# Patient Record
Sex: MALE | Race: BLACK OR AFRICAN AMERICAN | NOT HISPANIC OR LATINO | Employment: OTHER | URBAN - METROPOLITAN AREA
[De-identification: names, ages, dates, MRNs, and addresses within clinical notes are randomized per-mention and may not be internally consistent; named-entity substitution may affect disease eponyms.]

---

## 2017-05-15 ENCOUNTER — HOSPITAL ENCOUNTER (OUTPATIENT)
Dept: EMERGENCY MEDICINE | Facility: HOSPITAL | Age: 62
End: 2017-05-15
Attending: INTERNAL MEDICINE | Admitting: INTERNAL MEDICINE

## 2017-05-15 ENCOUNTER — HISTORICAL (OUTPATIENT)
Dept: RADIOLOGY | Facility: HOSPITAL | Age: 62
End: 2017-05-15

## 2017-05-15 LAB
ABS NEUT (OLG): 1.38 X10(3)/MCL (ref 2.1–9.2)
ALBUMIN SERPL-MCNC: 3.8 GM/DL (ref 3.4–5)
ALBUMIN/GLOB SERPL: 1 RATIO (ref 1–2)
ALP SERPL-CCNC: 59 UNIT/L (ref 20–120)
ALT SERPL-CCNC: 21 UNIT/L
AMPHET UR QL SCN: NEGATIVE
APPEARANCE, UA: CLEAR
AST SERPL-CCNC: 23 UNIT/L
BACTERIA #/AREA URNS AUTO: ABNORMAL /[HPF]
BARBITURATE SCN PRESENT UR: NEGATIVE
BASOPHILS # BLD AUTO: 0.04 X10(3)/MCL
BASOPHILS NFR BLD AUTO: 1 % (ref 0–1)
BENZODIAZ UR QL SCN: NEGATIVE
BILIRUB SERPL-MCNC: 0.8 MG/DL
BILIRUB UR QL STRIP: NEGATIVE
BILIRUBIN DIRECT+TOT PNL SERPL-MCNC: <0.1 MG/DL
BILIRUBIN DIRECT+TOT PNL SERPL-MCNC: >0.7 MG/DL
BNP BLD-MCNC: 20.2 PG/ML (ref 0–100)
BUN SERPL-MCNC: 14 MG/DL (ref 7–25)
CALCIUM SERPL-MCNC: 8.8 MG/DL (ref 8.4–10.3)
CANNABINOIDS UR QL SCN: NEGATIVE
CHLORIDE SERPL-SCNC: 103 MMOL/L (ref 96–110)
CHOLEST SERPL-MCNC: 211 MG/DL
CHOLEST/HDLC SERPL: 5.6 {RATIO} (ref 0–5)
CK MB SERPL-MCNC: 4.7 NG/ML (ref 0–7.7)
CK MB SERPL-MCNC: 5.3 NG/ML (ref 0–7.7)
CK SERPL-CCNC: 216 IU/L
CK SERPL-CCNC: 268 IU/L
CO2 SERPL-SCNC: 30 MMOL/L (ref 24–32)
COCAINE UR QL SCN: NEGATIVE
COLOR UR: YELLOW
CREAT SERPL-MCNC: 0.75 MG/DL (ref 0.7–1.4)
EOSINOPHIL # BLD AUTO: 0.6 X10(3)/MCL
EOSINOPHIL NFR BLD AUTO: 13 % (ref 0–5)
ERYTHROCYTE [DISTWIDTH] IN BLOOD BY AUTOMATED COUNT: 11.8 % (ref 11.5–14.5)
EST. AVERAGE GLUCOSE BLD GHB EST-MCNC: 103 MG/DL
GLOBULIN SER-MCNC: 3.2 GM/ML (ref 2.3–3.5)
GLUCOSE (UA): NORMAL
GLUCOSE SERPL-MCNC: 147 MG/DL (ref 65–99)
HBA1C MFR BLD: 5.2 % (ref 4.7–5.6)
HCT VFR BLD AUTO: 43 % (ref 40–51)
HDLC SERPL-MCNC: 38 MG/DL
HGB BLD-MCNC: 14.1 GM/DL (ref 13.5–17.5)
HGB UR QL STRIP: NEGATIVE
HYALINE CASTS #/AREA URNS LPF: ABNORMAL /[LPF]
IMM GRANULOCYTES # BLD AUTO: 0.02 10*3/UL
IMM GRANULOCYTES NFR BLD AUTO: 0 %
INR PPP: 1.05 (ref 0.9–1.2)
KETONES UR QL STRIP: NEGATIVE
LDLC SERPL CALC-MCNC: 140 MG/DL (ref 0–130)
LEUKOCYTE ESTERASE UR QL STRIP: NEGATIVE
LYMPHOCYTES # BLD AUTO: 2.18 X10(3)/MCL
LYMPHOCYTES NFR BLD AUTO: 49 % (ref 15–40)
MAGNESIUM SERPL-MCNC: 2 MG/DL (ref 1.5–2.6)
MCH RBC QN AUTO: 29.2 PG (ref 26–34)
MCHC RBC AUTO-ENTMCNC: 32.8 GM/DL (ref 31–37)
MCV RBC AUTO: 89 FL (ref 80–100)
MONOCYTES # BLD AUTO: 0.25 X10(3)/MCL
MONOCYTES NFR BLD AUTO: 6 % (ref 4–12)
NEUTROPHILS # BLD AUTO: 1.38 X10(3)/MCL
NEUTROPHILS NFR BLD AUTO: 31 X10(3)/MCL
NITRITE UR QL STRIP: NEGATIVE
OPIATES UR QL SCN: NEGATIVE
PCP UR QL: NEGATIVE
PH UR STRIP: 6 [PH] (ref 4.5–8)
PLATELET # BLD AUTO: 220 X10(3)/MCL (ref 130–400)
PMV BLD AUTO: 11 FL (ref 7.4–10.4)
POC TROPONIN: 0.01 NG/ML (ref 0–0.08)
POC TROPONIN: 0.01 NG/ML (ref 0–0.08)
POTASSIUM SERPL-SCNC: 3.4 MMOL/L (ref 3.6–5.2)
PROT SERPL-MCNC: 7 GM/DL (ref 6–8)
PROT UR QL STRIP: 10 MG/DL
PROTHROMBIN TIME: 13.5 SECOND(S) (ref 11.9–14.4)
RBC # BLD AUTO: 4.83 X10(6)/MCL (ref 4.5–5.9)
RBC #/AREA URNS AUTO: ABNORMAL /[HPF]
SODIUM SERPL-SCNC: 141 MMOL/L (ref 135–146)
SP GR UR STRIP: 1.02 (ref 1–1.03)
SQUAMOUS #/AREA URNS LPF: ABNORMAL /[LPF]
T4 FREE SERPL-MCNC: 1.22 NG/DL (ref 0.6–1.15)
TRIGL SERPL-MCNC: 163 MG/DL
TSH SERPL-ACNC: 0.97 MIU/L (ref 0.5–5)
UROBILINOGEN UR STRIP-ACNC: NORMAL
VLDLC SERPL CALC-MCNC: 33 MG/DL
WBC # SPEC AUTO: 4.5 X10(3)/MCL (ref 4.5–11)
WBC #/AREA URNS AUTO: ABNORMAL /HPF

## 2017-05-31 ENCOUNTER — HISTORICAL (OUTPATIENT)
Dept: CARDIOLOGY | Facility: HOSPITAL | Age: 62
End: 2017-05-31

## 2017-05-31 LAB
APTT PPP: 31.2 SECOND(S) (ref 23.3–37)
INR PPP: 0.99 (ref 0.9–1.2)
PROTHROMBIN TIME: 12.9 SECOND(S) (ref 11.9–14.4)

## 2017-09-14 ENCOUNTER — HISTORICAL (OUTPATIENT)
Dept: CARDIOLOGY | Facility: CLINIC | Age: 62
End: 2017-09-14

## 2017-09-14 LAB
ALBUMIN SERPL-MCNC: 3.4 GM/DL (ref 3.4–5)
ALBUMIN/GLOB SERPL: 1 RATIO (ref 1–2)
ALP SERPL-CCNC: 76 UNIT/L (ref 45–117)
ALT SERPL-CCNC: 33 UNIT/L (ref 12–78)
AST SERPL-CCNC: 21 UNIT/L (ref 15–37)
BILIRUB SERPL-MCNC: 0.7 MG/DL (ref 0.2–1)
BILIRUBIN DIRECT+TOT PNL SERPL-MCNC: 0.2 MG/DL
BILIRUBIN DIRECT+TOT PNL SERPL-MCNC: 0.5 MG/DL
BUN SERPL-MCNC: 13 MG/DL (ref 7–18)
CALCIUM SERPL-MCNC: 8.1 MG/DL (ref 8.5–10.1)
CHLORIDE SERPL-SCNC: 106 MMOL/L (ref 98–107)
CHOLEST SERPL-MCNC: 140 MG/DL
CHOLEST/HDLC SERPL: 3 {RATIO} (ref 0–5)
CO2 SERPL-SCNC: 31 MMOL/L (ref 21–32)
CREAT SERPL-MCNC: 0.8 MG/DL (ref 0.6–1.3)
GLOBULIN SER-MCNC: 3.5 GM/ML (ref 2.3–3.5)
GLUCOSE SERPL-MCNC: 131 MG/DL (ref 74–106)
HDLC SERPL-MCNC: 47 MG/DL
LDLC SERPL CALC-MCNC: 81 MG/DL (ref 0–130)
POTASSIUM SERPL-SCNC: 3.4 MMOL/L (ref 3.5–5.1)
PROT SERPL-MCNC: 6.9 GM/DL (ref 6.4–8.2)
SODIUM SERPL-SCNC: 143 MMOL/L (ref 136–145)
TRIGL SERPL-MCNC: 60 MG/DL
VLDLC SERPL CALC-MCNC: 12 MG/DL

## 2018-12-14 ENCOUNTER — HISTORICAL (OUTPATIENT)
Dept: ADMINISTRATIVE | Facility: HOSPITAL | Age: 63
End: 2018-12-14

## 2018-12-14 LAB
ALBUMIN SERPL-MCNC: 3.8 GM/DL (ref 3.4–5)
ALBUMIN/GLOB SERPL: 1 RATIO (ref 1–2)
ALP SERPL-CCNC: 77 UNIT/L (ref 45–117)
ALT SERPL-CCNC: 39 UNIT/L (ref 12–78)
APTT PPP: 36 SECOND(S) (ref 23.3–37)
AST SERPL-CCNC: 18 UNIT/L (ref 15–37)
BILIRUB SERPL-MCNC: 0.3 MG/DL (ref 0.2–1)
BILIRUBIN DIRECT+TOT PNL SERPL-MCNC: 0.1 MG/DL
BILIRUBIN DIRECT+TOT PNL SERPL-MCNC: 0.2 MG/DL
BUN SERPL-MCNC: 12 MG/DL (ref 7–18)
CALCIUM SERPL-MCNC: 9 MG/DL (ref 8.5–10.1)
CHLORIDE SERPL-SCNC: 102 MMOL/L (ref 98–107)
CO2 SERPL-SCNC: 30 MMOL/L (ref 21–32)
CREAT SERPL-MCNC: 0.9 MG/DL (ref 0.6–1.3)
ERYTHROCYTE [DISTWIDTH] IN BLOOD BY AUTOMATED COUNT: 11.6 % (ref 11.5–14.5)
EST. AVERAGE GLUCOSE BLD GHB EST-MCNC: 148 MG/DL
GLOBULIN SER-MCNC: 3.9 GM/ML (ref 2.3–3.5)
GLUCOSE SERPL-MCNC: 121 MG/DL (ref 74–106)
HBA1C MFR BLD: 6.8 % (ref 4.2–6.3)
HCT VFR BLD AUTO: 40.9 % (ref 40–51)
HGB BLD-MCNC: 13.5 GM/DL (ref 13.5–17.5)
INR PPP: 1.19 (ref 0.9–1.2)
MCH RBC QN AUTO: 29.7 PG (ref 26–34)
MCHC RBC AUTO-ENTMCNC: 33 GM/DL (ref 31–37)
MCV RBC AUTO: 89.9 FL (ref 80–100)
PLATELET # BLD AUTO: 219 X10(3)/MCL (ref 130–400)
PMV BLD AUTO: 11.4 FL (ref 7.4–10.4)
POTASSIUM SERPL-SCNC: 3.2 MMOL/L (ref 3.5–5.1)
PROT SERPL-MCNC: 7.7 GM/DL (ref 6.4–8.2)
PROTHROMBIN TIME: 15 SECOND(S) (ref 11.9–14.4)
RBC # BLD AUTO: 4.55 X10(6)/MCL (ref 4.5–5.9)
SODIUM SERPL-SCNC: 139 MMOL/L (ref 136–145)
WBC # SPEC AUTO: 7.4 X10(3)/MCL (ref 4.5–11)

## 2018-12-21 ENCOUNTER — HISTORICAL (OUTPATIENT)
Dept: SURGERY | Facility: HOSPITAL | Age: 63
End: 2018-12-21

## 2018-12-21 LAB
HCT VFR BLD AUTO: 35.3 % (ref 40–51)
HGB BLD-MCNC: 11.6 GM/DL (ref 13.5–17.5)
POTASSIUM SERPL-SCNC: 4.4 MMOL/L (ref 3.5–5.1)

## 2020-03-12 ENCOUNTER — HISTORICAL (OUTPATIENT)
Dept: RADIOLOGY | Facility: HOSPITAL | Age: 65
End: 2020-03-12

## 2020-08-04 ENCOUNTER — HISTORICAL (OUTPATIENT)
Dept: RADIOLOGY | Facility: HOSPITAL | Age: 65
End: 2020-08-04

## 2020-09-14 ENCOUNTER — HISTORICAL (OUTPATIENT)
Dept: RADIOLOGY | Facility: HOSPITAL | Age: 65
End: 2020-09-14

## 2022-04-10 ENCOUNTER — HISTORICAL (OUTPATIENT)
Dept: ADMINISTRATIVE | Facility: HOSPITAL | Age: 67
End: 2022-04-10

## 2022-04-21 ENCOUNTER — HISTORICAL (OUTPATIENT)
Dept: RADIOLOGY | Facility: HOSPITAL | Age: 67
End: 2022-04-21

## 2022-04-21 ENCOUNTER — HISTORICAL (OUTPATIENT)
Dept: ADMINISTRATIVE | Facility: HOSPITAL | Age: 67
End: 2022-04-21

## 2022-04-25 VITALS
DIASTOLIC BLOOD PRESSURE: 74 MMHG | OXYGEN SATURATION: 94 % | WEIGHT: 242.5 LBS | BODY MASS INDEX: 29.53 KG/M2 | HEIGHT: 76 IN | SYSTOLIC BLOOD PRESSURE: 123 MMHG

## 2022-04-30 NOTE — ED PROVIDER NOTES
Patient:   Mitchell Jauregui             MRN: [id number]            FIN: [id number]               Age:   61 years     Sex:  Male     :  1955   Associated Diagnoses:   Atrial fibrillation, new onset   Author:   Dajuan Puga MD      Basic Information   Time seen: Date & time 5/15/2017 09:39:00.   History source: Patient.   Arrival mode: Private vehicle.   History limitation: None.   Additional information: Chief Complaint from Nursing Triage Note : Chief Complaint   5/15/2017 9:28 CDT       Chief Complaint           Pt was having stress test and EKG tech noted A-Fib. States nothing in chart about pt being in A-fib.  .      History of Present Illness   The patient presents with 61-year-old male brought from cardiology secondary to A. fib while having a stress test done just prior to arrival.  Patient denies previous history.  Denies chest pain shortness of breath.  Currently has no complaints..  The onset was just prior to arrival.  The course/duration of symptoms is constant.  Character of symptoms irregular.  The degree at onset was minimal.  The degree at present is minimal.  The exacerbating factor is none.  The relieving factor is none.  Risk factors consist of hypertension.  Prior episodes: none.  Therapy today: see nurses notes.  Associated symptoms: none.        Review of Systems   Constitutional symptoms:  Negative except as documented in HPI.   Skin symptoms:  Negative except as documented in HPI.   Eye symptoms:  Negative except as documented in HPI.   ENMT symptoms:  Negative except as documented in HPI.   Respiratory symptoms:  Negative except as documented in HPI.   Cardiovascular symptoms:  Negative except as documented in HPI.   Gastrointestinal symptoms:  Negative except as documented in HPI.   Genitourinary symptoms:  Negative except as documented in HPI.   Musculoskeletal symptoms:  Negative except as documented in HPI.   Neurologic symptoms:  Negative except as documented in HPI.    Psychiatric symptoms:  Negative except as documented in HPI.   Endocrine symptoms:  Negative except as documented in HPI.   Hematologic/Lymphatic symptoms:  Negative except as documented in HPI.   Allergy/immunologic symptoms:  Negative except as documented in HPI.      Health Status   Allergies:    Allergic Reactions (Selected)  No Known Medication Allergies,    Allergies (1) Active Reaction  No Known Medication Allergies None Documented  .   Medications:  (Selected)   Prescriptions  Prescribed  ENTsol 3% nasal solution: 1 spray(s), Nasal, q10min, PRN PRN for dry nasal passages, # 100 mL, 0 Refill(s)  Flonase 50 mcg/inh nasal spray: 1 spray(s), Nasal, BID, # 1 EA, 0 Refill(s)  Norvasc 5 mg oral tablet: 5 mg = 1 tab(s), Oral, Daily, # 30 tab(s), 1 Refill(s)  Prilosec 40 mg oral DR capsule: 40 mg = 1 cap(s), Oral, Daily, # 30 cap(s), 0 Refill(s)  albuterol 90 mcg/inh inhalation powder: 2 puff(s), INH, q4hr, # 1 unit/2hr, 0 Refill(s)  nabumetone 750 mg oral tablet: 750 mg = 1 tab(s), Oral, BID, # 28 tab(s), 0 Refill(s)  Documented Medications  Documented  aspirin 81 mg oral tablet: 81 mg = 1 tab(s), Oral, Daily, # 30 tab(s), 0 Refill(s).      Past Medical/ Family/ Social History   Medical history:    No active or resolved past medical history items have been selected or recorded., Reviewed as documented in chart.   Surgical history:    Nasal polyp removed 9/2011.  Cholecystectomy; (24564).  BACK.  Hemorrhoidectomy, internal and external, single column/group; with fissurectomy (53670)., Reviewed as documented in chart.   Family history:    Diabetes mellitus type 2  Father  Primary malignant neoplasm of prostate  Father  , Reviewed as documented in chart.   Social history: Reviewed as documented in chart.   Problem list:    Active Problems (2)  Cholesterol   Hypertension   .      Physical Examination               Vital Signs      Vital Signs (last 24 hrs)_____  Last Charted___________  Temp Oral     36.8 DegC  (MAY  15 09:28)  Heart Rate Peripheral   L 52bpm  (MAY 15 09:28)  Resp Rate         18 br/min  (MAY 15 09:28)  SBP      H 151mmHg  (MAY 15 09:28)  DBP      H 93mmHg  (MAY 15 09:28)  SpO2      99 %  (MAY 15 09:28)  Weight      100 kg  (MAY 15 09:28)  Height      194 cm  (MAY 15 09:28)  BMI      26.57  (MAY 15 09:28)  .   General:  Alert, no acute distress.    Skin:  Warm, dry.    Head:  Normocephalic.   Neck:  Supple.   Eye:  Extraocular movements are intact, normal conjunctiva.    Ears, nose, mouth and throat:  Oral mucosa moist, no pharyngeal erythema or exudate.    Cardiovascular:  No edema, irregularly irregular rhythm.    Respiratory:  Respirations are non-labored.   Chest wall:  No tenderness.   Back:  Nontender.   Musculoskeletal:  Normal ROM.   Gastrointestinal:  Soft, Nontender.    Neurological:  No focal neurological deficit observed.   Lymphatics:  No lymphadenopathy.   Psychiatric:  Cooperative, appropriate mood & affect.       Medical Decision Making   Documents reviewed:  Emergency department nurses' notes, emergency department records, prior records.    Electrocardiogram:  Time 5/15/2017 09:32:00, rate 115, EP Interp, A. fib with RVR..    Results review:  Lab results : Lab View   5/15/2017 10:08 CDT      POC Troponin              0.01 ng/mL    5/15/2017 10:05 CDT      Sodium Lvl                141 mmol/L                             Potassium Lvl             3.4 mmol/L  LOW                             Chloride                  103 mmol/L                             CO2                       30 mmol/L                             Calcium Lvl               8.8 mg/dL                             Glucose Lvl               147 mg/dL  HI                             BUN                       14 mg/dL                             Creatinine                0.75 mg/dL                             eGFR-AA                   >105 mL/min                             eGFR-AC                  >105 mL/min                              Bili Total                0.8 mg/dL                             Bili Direct               <0.1 mg/dL                             Bili Indirect             >0.7 mg/dL                             AST                       23 unit/L                             ALT                       21 unit/L                             Alk Phos                  59 unit/L                             Total Protein             7.0 gm/dL                             Albumin Lvl               3.8 gm/dL                             Globulin                  3.20 gm/mL                             A/G Ratio                 1 ratio                             BNP                       20.2 pg/mL                             Total CK                  268 IU/L  HI                             CK MB                     5.3 ng/mL                             PT                        13.5 second(s)                             INR                       1.05                             WBC                       4.5 x10(3)/mcL                             RBC                       4.83 x10(6)/mcL                             Hgb                       14.1 gm/dL                             Hct                       43.0 %                             Platelet                  220 x10(3)/mcL                             MCV                       89.0 fL                             MCH                       29.2 pg                             MCHC                      32.8 gm/dL                             RDW                       11.8 %                             MPV                       11.0 fL  HI                             Abs Neut                  1.38 x10(3)/mcL  LOW                             Neutro Auto               31 x10(3)/mcL  NA                             Lymph Auto                49 %  HI                             Mono Auto                 6 %                             Eos Auto                  13 %  HI                              Abs Eos                   0.60 x10(3)/mcL  NA                             Basophil Auto             1 %                             Abs Neutro                1.38 x10(3)/mcL  NA                             Abs Lymph                 2.18 x10(3)/mcL  NA                             Abs Mono                  0.25 x10(3)/mcL  NA                             Abs Baso                  0.04 x10(3)/mcL  NA                             IG%                       0 %  NA                             IG#                       0.0200  NA    5/15/2017 10:00 CDT      UA Appear                 Clear                             UA Color                  YELLOW                             UA Spec Grav              1.016                             UA Bili                   Negative                             UA pH                     6.0                             UA Urobilinogen           Normal                             UA Blood                  Negative                             UA Glucose                Normal                             UA Ketones                Negative                             UA Protein                10 mg/dL                             UA Nitrite                Negative                             UA Leuk Est               Negative                             UA WBC Interp             0-2 /HPF                             UA RBC Interp             0-2                             UA Bact Interp            None Seen                             UA Squam Epi Interp       2-20                             UA Hyal Cast Interp       6-10                             U Amph Scr                Negative                             U Marjorie Scr                Negative                             U Benzodia Scr            Negative                             U Cannab Scr              Negative                             U Cocaine Scr             Negative                             U Opiate Scr              Negative                              U Phencyclidine Scr       Negative  ,    No qualifying data available.    Radiology results:  Rad Results (ST)  < 12 hrs   Accession: [id number]  Order: XR Chest 2 Views  Report Dt/Tm: 05/15/2017 11:06  Report:   XR Chest 2 Views     REASON FOR STUDY: Tachycardia     TECHNIQUE: Frontal and lateral radiographs of the chest     COMPARISON: Radiographs from 4/8/2017     FINDINGS: Cardiac silhouette and mediastinal contours are within  normal limits. The lungs are well-inflated. No consolidation or  evidence of pulmonary edema. There is no pleural effusion.         IMPRESSION: No acute pulmonary process appreciated.          .      Reexamination/ Reevaluation   Course: progressing as expected.      Impression and Plan   Diagnosis   Atrial fibrillation, new onset (CID43-GE I48.91)      Calls-Consults   -  IM on call.   Plan   Condition: Stable.    Disposition: Admit time  5/15/2017 12:51:00, Place in Observation Unit, Dispositioned by: Time: 5/15/2017 10:08:00, Dajuan Puga MD.    Counseled: Patient, Regarding diagnosis, Regarding diagnostic results, Regarding treatment plan, Regarding prescription, Patient indicated understanding of instructions.

## 2022-04-30 NOTE — H&P
"   Patient:   Mitchell Jauregui             MRN: [id number]            FIN: [id number]               Age:   61 years     Sex:  Male     :  1955   Associated Diagnoses:   None   Author:   Cj Rodriguez MD      Chief Complaint:  New onset atrial fibrillation    HPI:  61-year-old -American male with a past medical history of hypertension came to [facility] today for scheduled Lexiscan, prior to procedure EKG was ordered and showed patient was in atrial fibrillation with a rate of 90, after patient completed Lexiscan he was sent to ED for workup of new onset atrial fibrillation.  Patient currently denying any symptoms, he reports that he was sent for Lexiscan because he told his primary care doctor that he felt "puffy, swollen" around his heart, however he denies any symptoms of chest pain or feelings of tightness, denies any heart palpitations, syncope, shortness of breath, leg swelling, dry cough, orthopnea, PND, he reports that he can walk several miles without become short of breath.  Patient also denies any symptoms of heat/cold intolerance, denies any weight gain/weight loss, denies hair loss, skin changes, or diarrhea, patient does report constipation.  Patient only takes Norvasc at home for hypertension, does not have a history of diabetes, or vascular disease.  He denies a history of tobacco abuse, drinks beers on the weekends every once in a while, denies any illicit drug use.  Currently does not report any other acute symptoms.    Allergies:  NKDA    Past Medical Hx:  Hypertension    Past Surgical Hx:  Nasal polyp removal:   Cholecystectomy: 2015  Back surgery: Spinal fusion was still rods  Hemorrhoidectomy    Past Family Hx:  Father: Diabetes    Past Social Hx:  Alcohol Use: Beer intermittently on weekends  Tobacco Use: Denies  Illicit Drug Use: Denies   Occupation: Disabled    Review Of Systems:  Constitutional: no fever, no night sweats, chills or fatigue  HEENT: no acute vision changes or " photobia, no hearing loss  CVS: no chest pain, palpitations, or orthopnea  Resp: no SOB, cough, or wheezing  GI: no abd pain, nausea, vomiting, or diarrhea  : no dysuria, urinary incontinence  Musculoskeletal: no joint stiffness, pain, swelling or weakness  Skin: no rashes, lesions  Neuro: no headaches, syncope, seizures, or numbness  Psych: no depression or anxiety    Home meds:         Prescribed             albuterol: 2 puff(s), INH, q4hr, 1 unit/2hr, 0 Refill(s).             amLODIPine: 5 mg, 1 tab(s), Oral, Daily, for 30 day(s), 30 tab(s), 1               Refill(s).             fluticasone nasal: 1 spray(s), Nasal, BID, 1 EA, 0 Refill(s).             sodium chloride nasal: 1 spray(s), Nasal, q10min, PRN: for dry nasal               passages, 100 mL, 0 Refill(s).         Documented             aspirin: 81 mg, 1 tab(s), Oral, Daily, 30 tab(s), 0 Refill(s).    Physical Examination:  Vital Signs (last 24 hrs)_____  Last Charted___________  Temp Oral     36.8 DegC  (MAY 15 09:28)  Heart Rate Peripheral   77 bpm  (MAY 15 11:19)  Resp Rate         H 25br/min  (MAY 15 13:24)  SBP      H 170mmHg  (MAY 15 13:24)  DBP      H 118mmHg  (MAY 15 13:24)  SpO2      96 %  (MAY 15 13:24)  Weight      100 kg  (MAY 15 09:28)  Height      194 cm  (MAY 15 09:28)  BMI      26.57  (MAY 15 09:28)    General: AAOx3, NAD, alert and cooperative  HEENT: PERRLA, EOMI, normal conjunctiva  Neck: no LAD, no JVD, supple, no masses or thyromegaly  CVS: S1/S2 nml, irregularly irregular rhythm, no murmurs, rubs or gallops, no carotid bruits  Resp: CTA B/L, no rhonchi, rales, or wheezing  GI: no TTP, not distended, BS+  : no CVA tenderness  Skin: not jaundiced, warm, no rashes  Musculoskeletal: normal ROM, no joint tenderness, normal muscular development  Extremities: no peripheral edema, peripheral pulses intact  Neuro: CN II-XII grossly intact, strength and sensation symmetric and intact throughout, no focal neurological  deficits    Labs:  CMP: Potassium 3.4, BUN/creatinine14/0.75 otherwise electrolytes, LFTs within normal limits.  BNP: 20.2    Cardiac enzymes: Total CK268, CK-MB5.3, POC troponin0.01    PT/INR: 13.5/1.05    CBC: Within normal limits    Urinalysis: Within normal limits    UDS: Negative    EKG:  A. fib, heart rate 1:15, normal AK/QRS QT intervals, no ST segment changes, no T-wave inversions    Radiology:  Accession: [id number]  Order: XR Chest 2 Views  Report Dt/Tm: 05/15/2017 11:06  Report:   XR Chest 2 Views     REASON FOR STUDY: Tachycardia     TECHNIQUE: Frontal and lateral radiographs of the chest     COMPARISON: Radiographs from 4/8/2017     FINDINGS: Cardiac silhouette and mediastinal contours are within  normal limits. The lungs are well-inflated. No consolidation or  evidence of pulmonary edema. There is no pleural effusion.         IMPRESSION: No acute pulmonary process appreciated.        Assessment and Plan:   New onset atrial fibrillation  -Patient presented to [facility] for Lexiscan, found to be in atrial fibrillation, currently asymptomatic, rate of 90  -We'll admit patient to telemetry with monitor to determine etiology of atrial fibrillation, ordered TSH/free T4, echocardiogram, trend cardiac enzymes  -BNP/electrolytes/CBC/chest x-ray within normal limits  -CHADSVASc: 1, ordered lipid panel, hemoglobin A1c  -Patient currently rate controlled, order metoprolol 10 mg IV for heart rate greater than 110    Hypertension   Patient's blood pressure 158/93, reports he did not take his Norvasc 5 mg this morning, will restart patient on Norvasc, labetalol 20 mg IV for blood pressure greater than 160/110    Disposition: Admit patient to telemetry with monitor, follow-up Lexiscan, echocardiogram, TSH, hemoglobin A1c, if results returned within normal limits and patient remains rate controlled, will discharge tomorrow with cardiology clinic follow-up.

## 2023-02-07 ENCOUNTER — HOSPITAL ENCOUNTER (OUTPATIENT)
Dept: RADIOLOGY | Facility: HOSPITAL | Age: 68
Discharge: HOME | End: 2023-02-07
Payer: MEDICARE

## 2023-02-07 DIAGNOSIS — M25.561 PAIN IN RIGHT KNEE: ICD-10-CM

## 2023-02-07 PROCEDURE — 73562 X-RAY EXAM OF KNEE 3: CPT | Mod: TC,RT

## 2023-10-31 ENCOUNTER — HOSPITAL ENCOUNTER (OUTPATIENT)
Dept: RADIOLOGY | Facility: HOSPITAL | Age: 68
Discharge: HOME | End: 2023-10-31
Attending: NURSE PRACTITIONER
Payer: MEDICARE

## 2023-10-31 DIAGNOSIS — M25.511 RIGHT SHOULDER PAIN: ICD-10-CM

## 2023-10-31 PROCEDURE — 73030 X-RAY EXAM OF SHOULDER: CPT | Mod: TC,RT

## 2023-11-16 DIAGNOSIS — M25.511 CHRONIC RIGHT SHOULDER PAIN: Primary | ICD-10-CM

## 2023-11-16 DIAGNOSIS — G89.29 CHRONIC RIGHT SHOULDER PAIN: Primary | ICD-10-CM

## 2023-12-05 ENCOUNTER — OFFICE VISIT (OUTPATIENT)
Dept: ORTHOPEDICS | Facility: CLINIC | Age: 68
End: 2023-12-05
Payer: MEDICARE

## 2023-12-05 ENCOUNTER — HOSPITAL ENCOUNTER (OUTPATIENT)
Dept: RADIOLOGY | Facility: HOSPITAL | Age: 68
Discharge: HOME | End: 2023-12-05
Attending: STUDENT IN AN ORGANIZED HEALTH CARE EDUCATION/TRAINING PROGRAM
Payer: MEDICARE

## 2023-12-05 VITALS
BODY MASS INDEX: 26.96 KG/M2 | WEIGHT: 221.38 LBS | DIASTOLIC BLOOD PRESSURE: 78 MMHG | HEART RATE: 80 BPM | TEMPERATURE: 98 F | SYSTOLIC BLOOD PRESSURE: 136 MMHG | HEIGHT: 76 IN

## 2023-12-05 DIAGNOSIS — S46.811A STRAIN OF RIGHT INFRASPINATUS TENDON, INITIAL ENCOUNTER: ICD-10-CM

## 2023-12-05 DIAGNOSIS — S46.811A TRAUMATIC RUPTURE OF SUPRASPINATUS TENDON OF RIGHT SHOULDER, INITIAL ENCOUNTER: Primary | ICD-10-CM

## 2023-12-05 DIAGNOSIS — M25.511 CHRONIC RIGHT SHOULDER PAIN: ICD-10-CM

## 2023-12-05 DIAGNOSIS — G89.29 CHRONIC RIGHT SHOULDER PAIN: ICD-10-CM

## 2023-12-05 PROCEDURE — 99213 OFFICE O/P EST LOW 20 MIN: CPT | Mod: PBBFAC

## 2023-12-05 PROCEDURE — 73030 X-RAY EXAM OF SHOULDER: CPT | Mod: TC,RT

## 2023-12-05 RX ORDER — PLANT STANOL ESTER 450 MG
8000 TABLET ORAL
COMMUNITY

## 2023-12-05 RX ORDER — ALBUTEROL SULFATE 90 UG/1
2 AEROSOL, METERED RESPIRATORY (INHALATION) EVERY 4 HOURS PRN
COMMUNITY
Start: 2023-02-23

## 2023-12-05 RX ORDER — RIVAROXABAN 20 MG/1
20 TABLET, FILM COATED ORAL
COMMUNITY
Start: 2023-11-28

## 2023-12-05 RX ORDER — LORAZEPAM 2 MG/1
2 TABLET ORAL 2 TIMES DAILY PRN
COMMUNITY

## 2023-12-05 RX ORDER — DICLOFENAC SODIUM 75 MG/1
75 TABLET, DELAYED RELEASE ORAL 2 TIMES DAILY PRN
Qty: 15 TABLET | Refills: 0 | Status: SHIPPED | OUTPATIENT
Start: 2023-12-05 | End: 2024-01-04

## 2023-12-05 RX ORDER — ALBUTEROL SULFATE 0.83 MG/ML
SOLUTION RESPIRATORY (INHALATION)
COMMUNITY

## 2023-12-05 RX ORDER — FERROUS SULFATE 325(65) MG
65 TABLET ORAL
COMMUNITY

## 2023-12-05 RX ORDER — METOPROLOL SUCCINATE 25 MG/1
25 TABLET, EXTENDED RELEASE ORAL
COMMUNITY

## 2023-12-05 RX ORDER — RIVAROXABAN 10 MG/1
TABLET, FILM COATED ORAL
COMMUNITY
Start: 2023-03-28

## 2023-12-05 RX ORDER — AMLODIPINE BESYLATE 10 MG/1
10 TABLET ORAL
COMMUNITY

## 2023-12-05 RX ORDER — GLIPIZIDE 5 MG/1
TABLET ORAL
COMMUNITY
Start: 2023-04-25

## 2023-12-05 RX ORDER — DEXBROMPHENIRAMINE MALEATE 2 MG/1
TABLET ORAL
COMMUNITY

## 2023-12-05 RX ORDER — LANOLIN ALCOHOL/MO/W.PET/CERES
500 CREAM (GRAM) TOPICAL
COMMUNITY

## 2023-12-05 NOTE — PROGRESS NOTES
"Subjective:    Patient ID: Mitchell Jauregui is a right handed 68 y.o. male  who presented to [facility] for new visit..      Chief Complaint: Pain of the Right Shoulder (Patient is here today for  Right Shoulder pain.  It has been going on for  2 months. States pain started when he was lifting on heavy objects, trying to put them into the back of his truck. States had difficulty with shoulder/arm after. Describes his pain as intermittent aching. Is currently not taking any medication for pain at this time.   )      History of Present Illness:    Mitchell Jauregui who has a history of Afib on xarelto and ASA, HTN, HLD, hyperglycemia, OA, RYAN, T2DM and left knee OA presented today for right supraspinatous tear involving the right for the past 2 months. Pain is located at AC joint, lateral acromion, deltoid muscle. Quality of pain is described as throbbing.  Non radiating. Inciting event: injured while picking up an aluminum car wheel .  Pain is aggravated by reaching, lifting. He has difficulty drinking out of glasses with his right arm. Night pain yes. Patient has had no prior shoulder problems. Evaluation to date: plain films, MRI, and PCP evaluation. Treatment to date: topical analgesics and ice/heat. 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     Shoulder Review of Systems:  Swelling?  no  Instability?  yes  Clicking?  no  Limited ROM? yes  Fever/Chills? no  Subluxation? no  Dislocation? no    Current Choice of Exercise:  none       Objective:      Physical Exam:    /78   Pulse 80   Temp 97.6 °F (36.4 °C)   Ht 6' 4" (1.93 m)   Wt 100.4 kg (221 lb 6.4 oz)   BMI 26.95 kg/m²     Appearance:  Soft tissue swelling: Left: no Right: no  Effusion: Left:  Negative Right: Negative  Erythema: Left no Right: no  Ecchymosis: Left: no Right: no  Atrophy: Left: no Right: yes  Scapular winging: Left: no Right: " no    Palpation:  Shoulder Tenderness: Left: none  Right: AC joint, biceps tendon, lateral acromion, deltoid muscle, supraspinatus    Range of motion:  Flexion (0-90): Left:  90 Right: 90  Abduction (0-180): Left:  180 Right: 160  External rotation (0-55): Left: 55 Right: 55  Internal rotation (0-45): Left: 45 Right: 45    Strength:  Abduction: Left: 5/5 Pain: no Right: 4/5 Pain: yes  External rotation: Left: 5/5 Pain: no Right: 4/5 Pain: yes  Internal rotation: Left: 5/5 Pain: no Right: 5/5 Pain: no  Elbow flexion: Left: 5/5 Pain: no Right: 5/5 Pain: no  Elbow extension: Left: 5/5 Pain: no Right: 5/5 Pain: no    Special Tests:  Subacromial Impingement  Neer: Left: Negative Right: Positive  Correa: Left: Negative Right: Positive    AC Joint Arthritis:  Cross-body abduction: Left: Negative Right: Positive    Rotator Cuff Tear   Drop arm test: Left: Negative Right: Negative  Hornblower: Left: Left: Not performed Right: Positive 4/5 w/ pain  Belly press test: Left: Negative Right: Negative  Felipe test (Empty can): Left: Negative Right: Positive 4/5 w/ pain    Biceps tendon/Labral tear  Speed's: Left: Negative Right: Negative  Yergason's: Left: Negative Right: Negative  O'Rivas's: Left: Negative Right: Negative  Cranck test: Left: Negative Right: Negative     Cervical   Spurling: Left: Negative Right: Negative    AIN/PIN/Ulnar nerve: Intact and symmetric    General appearance: NAD  Peripheral pulses: normal bilaterally   Reflexes: Left: Not performed Right: Not performed   Sensation: normal    Labs:  Last A1c: The patient doesn't have any registry metric data available     Imaging:   Previous images reviewed.  X-rays ordered and performed today: yes  # of views: 4 Laterality: right  My Interpretation:  Decreased acromial humeral interval, osteophytes to the inferior and lateral acromion.  Osteophyte versus bony Bankart of the inferior glenoid.     11/08/2023 MRI right shoulder without contrast:  High-grade  partial-thickness supraspinatus tear.  Severe tendinosis of the supraspinatus, infraspinatus and subscapularis tendon with moderate supraspinatus, infraspinatus and mild subscapularis muscle atrophy.  Glenohumeral and acromioclavicular osteoarthritis with labral tear.  (report only)  Assessment:        Encounter Diagnoses   Code Name Primary?    S46.811A Traumatic rupture of supraspinatus tendon of right shoulder, initial encounter Yes    S46.811A Strain of right infraspinatus tendon, initial encounter         Plan:           Orders Placed This Encounter   Procedures    X-ray Shoulder 2 or More Views Right     Standing Status:   Future     Number of Occurrences:   1     Standing Expiration Date:   12/5/2024     Order Specific Question:   May the Radiologist modify the order per protocol to meet the clinical needs of the patient?     Answer:   Yes     Order Specific Question:   Release to patient     Answer:   Immediate     Medications Ordered This Encounter   Medications    diclofenac (VOLTAREN) 75 MG EC tablet     Sig: Take 1 tablet (75 mg total) by mouth 2 (two) times daily as needed. Please request refill of this medication from your PCP.     Dispense:  15 tablet     Refill:  0     Please request refill of this medication from your PCP.       MDM: Prior external referring provider notes reviewed. Prior external referring provider studies reviewed.   Dx:  Traumatic High-grade partial-thickness supraspinatus tear right shoulder.  Tendinosis of the infraspinatus and subscapularis of the right shoulder.  New problems in moderate exacerbation.  Treatment Plan: Discussed with patient diagnosis and treatment recommendations.   -patient is having weakness of the supraspinatus infraspinatus and subscapularis.  MRI report shows high-grade partial-thickness tear of the supraspinatus with atrophy and tendinosis of the infraspinatus subscapularis.  -patient is active in would benefit from a return to function.  -operative  versus nonoperative management discussed with the patient patient like to consider operative management at this time.  -recommend patient bring his MRI disc with him and we will follow up in 1 month to discuss further management.  Imaging: radiological studies ordered and independently reviewed; discussed with patient; pending radiologist interpretation.   Procedure:  Not appropriate at this time as patient is considering surgical management.  We will discuss at follow-up.  Therapy: No formal therapy  Medication: START diclofenac 75mg twice a day. Please see your primary care physician for further refills.  RTC:  1 month with MRI disc.         Bobby Cavazos MD.  Note dictated using MModal.

## 2023-12-15 NOTE — PROGRESS NOTES
Faculty Attestation: Mitchell Jauregui  was seen in Sports Medicine Clinic. Discussed with Dr. Cavazos at the time of the visit. History of Present Illness, Physical Exam, and Assessment and Plan reviewed. Treatment plan is reasonable and appropriate. Compliance with treatment recommendations is important.  Radiology images independently reviewed and agree with fellow interpretation.  No procedure was performed.     Cirilo Rivas MD  Sports Medicine

## 2024-12-27 ENCOUNTER — HOSPITAL ENCOUNTER (EMERGENCY)
Facility: HOSPITAL | Age: 69
Discharge: HOME | End: 2024-12-27
Attending: EMERGENCY MEDICINE
Payer: MEDICARE

## 2024-12-27 VITALS
WEIGHT: 220 LBS | HEART RATE: 75 BPM | BODY MASS INDEX: 26.79 KG/M2 | RESPIRATION RATE: 19 BRPM | TEMPERATURE: 99 F | SYSTOLIC BLOOD PRESSURE: 148 MMHG | OXYGEN SATURATION: 98 % | DIASTOLIC BLOOD PRESSURE: 88 MMHG | HEIGHT: 76 IN

## 2024-12-27 DIAGNOSIS — J20.9 ACUTE BRONCHITIS, UNSPECIFIED ORGANISM: Primary | ICD-10-CM

## 2024-12-27 LAB
FLUAV AG UPPER RESP QL IA.RAPID: NOT DETECTED
FLUBV AG UPPER RESP QL IA.RAPID: NOT DETECTED
SARS-COV-2 RNA RESP QL NAA+PROBE: NOT DETECTED

## 2024-12-27 PROCEDURE — 99284 EMERGENCY DEPT VISIT MOD MDM: CPT | Mod: 25

## 2024-12-27 PROCEDURE — 0240U COVID/FLU A&B PCR: CPT | Performed by: EMERGENCY MEDICINE

## 2024-12-27 RX ORDER — AZITHROMYCIN 250 MG/1
TABLET, FILM COATED ORAL
Qty: 6 TABLET | Refills: 0 | Status: SHIPPED | OUTPATIENT
Start: 2024-12-27 | End: 2025-01-01

## 2024-12-27 RX ORDER — ALBUTEROL SULFATE 0.83 MG/ML
2.5 SOLUTION RESPIRATORY (INHALATION) EVERY 4 HOURS PRN
Qty: 30 EACH | Refills: 0 | Status: SHIPPED | OUTPATIENT
Start: 2024-12-27

## 2024-12-27 NOTE — ED PROVIDER NOTES
Encounter Date: 12/27/2024       History     Chief Complaint   Patient presents with    Cough     Cough and congestion for 1 week. Denies fever     HPI  69-year-old male history of hypertension complaining of one-week history of productive cough of greenish sputum with no associated shortness of breath, substernal chest pain, nausea vomiting fever chills diarrhea.  Patient does take an albuterol nebulizer at home and is requesting a refill for that medication.  No ill contacts.  Patient is not vaccinated against COVID-19 or influenza.  Review of patient's allergies indicates:  No Known Allergies  Past Medical History:   Diagnosis Date    Hypertension      Past Surgical History:   Procedure Laterality Date    BACK SURGERY      CHOLECYSTECTOMY      hemorrhoid ectomy       Family History   Family history unknown: Yes     Social History     Tobacco Use    Smoking status: Never    Smokeless tobacco: Never   Substance Use Topics    Alcohol use: Not Currently    Drug use: Never     Review of Systems   All other systems reviewed and are negative.      Physical Exam     Initial Vitals [12/27/24 1036]   BP Pulse Resp Temp SpO2   (!) 156/83 73 20 98.6 °F (37 °C) 97 %      MAP       --         Physical Exam    Nursing note and vitals reviewed.  Constitutional: He appears well-developed and well-nourished. He is cooperative.   HENT:   Head: Normocephalic and atraumatic.   Eyes: Conjunctivae, EOM and lids are normal. Pupils are equal, round, and reactive to light.   Neck: Trachea normal. Neck supple.   Normal range of motion.  Cardiovascular:  Normal rate, regular rhythm, normal heart sounds and intact distal pulses.           Pulmonary/Chest: Breath sounds normal. No respiratory distress. He has no wheezes. He has no rhonchi. He has no rales.   Abdominal: Abdomen is soft. Bowel sounds are normal. He exhibits no distension and no mass. There is no abdominal tenderness. There is no rebound and no guarding.   Musculoskeletal:       Cervical back: Normal range of motion and neck supple.     Neurological: He is alert and oriented to person, place, and time. He has normal strength.   Skin: Skin is warm and dry. No rash noted.   Psychiatric: He has a normal mood and affect. His speech is normal and behavior is normal.         ED Course   Procedures  Labs Reviewed   COVID/FLU A&B PCR - Normal       Result Value    Influenza A PCR Not Detected      Influenza B PCR Not Detected      SARS-CoV-2 PCR Not Detected      Narrative:     The Xpert Xpress SARS-CoV-2/FLU/RSV plus is a rapid, multiplexed real-time PCR test intended for the simultaneous qualitative detection and differentiation of SARS-CoV-2, Influenza A, Influenza B, and respiratory syncytial virus (RSV) viral RNA in either nasopharyngeal swab or nasal swab specimens.                Imaging Results              X-Ray Chest PA And Lateral (Final result)  Result time 12/27/24 11:53:51      Final result by Sean Escalante MD (12/27/24 11:53:51)                   Impression:      1. Pulmonary nodule again noted to the lateral right upper lobe with questionable slight interim increase in size.  2. Thoracic spondylosis      Electronically signed by: Sean Escalante  Date:    12/27/2024  Time:    11:53               Narrative:    EXAMINATION:  XR CHEST PA AND LATERAL    CLINICAL HISTORY:  , Cough, unspecified.    COMPARISON:  04/21/2022    FINDINGS:  PA/AP and lateral views reveal to be normal in size.  The trachea is midline.  No definite infiltrate or effusion is seen.  Degenerative changes are noted to the thoracic spine.  A ill-defined nodule again evident at the lateral right upper lobe with questionable slight interim increase in size no infiltrate or effusion is seen.  Degenerative changes are noted to the thoracic spine.                                       Medications - No data to display  Medical Decision Making  Amount and/or Complexity of Data Reviewed  Radiology:  ordered.    Risk  Prescription drug management.    61-year-old male complaining of one-week history of productive cough of greenish sputum.  O2 sat 97% on room air.  Afebrile.  Lungs are clear to auscultation.  COVID, influenza negative.  Chest x-ray with no acute focal infiltrates or effusions.  We will discharge home with Zithromax Rx, Tessalon Perles, refill albuterol nebulizer, close follow up with PCP in 1-2 days for recheck, return for worsening symptoms or any other concerns.                                  Clinical Impression:  Final diagnoses:  [J20.9] Acute bronchitis, unspecified organism (Primary)          ED Disposition Condition    Discharge Stable          ED Prescriptions       Medication Sig Dispense Start Date End Date Auth. Provider    albuterol (PROVENTIL) 2.5 mg /3 mL (0.083 %) nebulizer solution Take 3 mLs (2.5 mg total) by nebulization every 4 (four) hours as needed for Wheezing. Rescue 30 each 12/27/2024 -- Juventino Centeno MD    azithromycin (Z-NOHEMY) 250 MG tablet Take 2 tablets by mouth on day 1; Take 1 tablet by mouth on days 2-5 6 tablet 12/27/2024 1/1/2025 Juventino Centeno MD          Follow-up Information       Follow up With Specialties Details Why Contact Info    Theresa Mcclain NP Family Medicine Schedule an appointment as soon as possible for a visit in 2 days     [place] [address]  [phone]               Juventino Centeno MD  12/28/24 0621

## 2025-02-17 ENCOUNTER — HOSPITAL ENCOUNTER (EMERGENCY)
Facility: HOSPITAL | Age: 70
Discharge: HOME | End: 2025-02-17
Attending: INTERNAL MEDICINE
Payer: MEDICARE

## 2025-02-17 VITALS
RESPIRATION RATE: 20 BRPM | TEMPERATURE: 98 F | HEART RATE: 77 BPM | SYSTOLIC BLOOD PRESSURE: 139 MMHG | BODY MASS INDEX: 26.79 KG/M2 | HEIGHT: 76 IN | OXYGEN SATURATION: 98 % | DIASTOLIC BLOOD PRESSURE: 71 MMHG | WEIGHT: 220 LBS

## 2025-02-17 DIAGNOSIS — J18.9 PNEUMONIA OF LEFT LOWER LOBE DUE TO INFECTIOUS ORGANISM: Primary | ICD-10-CM

## 2025-02-17 LAB
FLUAV AG UPPER RESP QL IA.RAPID: NOT DETECTED
FLUBV AG UPPER RESP QL IA.RAPID: NOT DETECTED
RSV A 5' UTR RNA NPH QL NAA+PROBE: NOT DETECTED
SARS-COV-2 RNA RESP QL NAA+PROBE: NOT DETECTED

## 2025-02-17 PROCEDURE — 25000242 PHARM REV CODE 250 ALT 637 W/ HCPCS

## 2025-02-17 PROCEDURE — 99284 EMERGENCY DEPT VISIT MOD MDM: CPT | Mod: 25

## 2025-02-17 PROCEDURE — 0241U COVID/RSV/FLU A&B PCR: CPT

## 2025-02-17 PROCEDURE — 94761 N-INVAS EAR/PLS OXIMETRY MLT: CPT

## 2025-02-17 PROCEDURE — 94640 AIRWAY INHALATION TREATMENT: CPT

## 2025-02-17 RX ORDER — AMOXICILLIN AND CLAVULANATE POTASSIUM 875; 125 MG/1; MG/1
1 TABLET, FILM COATED ORAL 2 TIMES DAILY
Qty: 10 TABLET | Refills: 0 | Status: SHIPPED | OUTPATIENT
Start: 2025-02-17 | End: 2025-02-22

## 2025-02-17 RX ORDER — DOXYCYCLINE 100 MG/1
100 CAPSULE ORAL 2 TIMES DAILY
Qty: 10 CAPSULE | Refills: 0 | Status: SHIPPED | OUTPATIENT
Start: 2025-02-17 | End: 2025-02-22

## 2025-02-17 RX ORDER — ALBUTEROL SULFATE 0.83 MG/ML
2.5 SOLUTION RESPIRATORY (INHALATION) EVERY 6 HOURS PRN
Qty: 30 EACH | Refills: 3 | Status: SHIPPED | OUTPATIENT
Start: 2025-02-17 | End: 2025-03-19

## 2025-02-17 RX ORDER — ALBUTEROL SULFATE 0.83 MG/ML
2.5 SOLUTION RESPIRATORY (INHALATION)
Status: COMPLETED | OUTPATIENT
Start: 2025-02-17 | End: 2025-02-17

## 2025-02-17 RX ORDER — BENZONATATE 100 MG/1
100 CAPSULE ORAL 3 TIMES DAILY PRN
Qty: 21 CAPSULE | Refills: 0 | Status: SHIPPED | OUTPATIENT
Start: 2025-02-17 | End: 2025-02-24

## 2025-02-17 RX ADMIN — ALBUTEROL SULFATE 2.5 MG: 2.5 SOLUTION RESPIRATORY (INHALATION) at 01:02

## 2025-02-17 NOTE — ED PROVIDER NOTES
"Encounter Date: 2/17/2025       History     Chief Complaint   Patient presents with    Nasal Congestion    Wheezing     C/o sinus congestion and wheezing since Thursday     See MDM    The history is provided by the patient. No  was used.     Review of patient's allergies indicates:  No Known Allergies  Past Medical History:   Diagnosis Date    Hypertension      Past Surgical History:   Procedure Laterality Date    BACK SURGERY      CHOLECYSTECTOMY      hemorrhoid ectomy       Family History   Family history unknown: Yes     Social History[1]  Review of Systems   Constitutional:         Cough, congestion,"whistling noise when he breathes", rhinorrhea   All other systems reviewed and are negative.      Physical Exam     Initial Vitals [02/17/25 1258]   BP Pulse Resp Temp SpO2   (!) 176/71 95 18 97.3 °F (36.3 °C) (!) 94 %      MAP       --         Physical Exam    Nursing note and vitals reviewed.  Constitutional: He appears well-developed and well-nourished.   HENT:   Head: Normocephalic and atraumatic.   Right Ear: External ear normal.   Left Ear: External ear normal.   Nose: Mucosal edema and rhinorrhea present. Mouth/Throat: Uvula is midline. Posterior oropharyngeal erythema present.   Bilateral cerumen impaction, unable to visualize TM   Eyes: EOM are normal.   Neck:   Normal range of motion.  Cardiovascular:  Normal rate, regular rhythm and intact distal pulses.           Pulmonary/Chest: Effort normal. No accessory muscle usage. No tachypnea and no bradypnea. No respiratory distress. He has rhonchi in the right upper field, the right middle field, the right lower field, the left upper field, the left middle field and the left lower field. He has rales in the right upper field and the left upper field.   Abdominal: Abdomen is soft. Bowel sounds are normal.   Musculoskeletal:         General: Normal range of motion.      Cervical back: Normal range of motion.     Neurological: He is alert and " oriented to person, place, and time.   Skin: Skin is warm and dry.   Psychiatric: He has a normal mood and affect.         ED Course   Procedures  Labs Reviewed   COVID/RSV/FLU A&B PCR - Normal       Result Value    Influenza A PCR Not Detected      Influenza B PCR Not Detected      Respiratory Syncytial Virus PCR Not Detected      SARS-CoV-2 PCR Not Detected      Narrative:     The Xpert Xpress SARS-CoV-2/FLU/RSV plus is a rapid, multiplexed real-time PCR test intended for the simultaneous qualitative detection and differentiation of SARS-CoV-2, Influenza A, Influenza B, and respiratory syncytial virus (RSV) viral RNA in either nasopharyngeal swab or nasal swab specimens.                Imaging Results              X-Ray Chest PA And Lateral (Final result)  Result time 02/17/25 14:45:39      Final result by Sean Escalante MD (02/17/25 14:45:39)                   Impression:      1. Subtle nodularity again evident at the lateral right upper lobe overlying the anterior right 2nd rib  2. Developing infiltrate and or atelectasis left lung base  3. Thoracic spondylosis      Electronically signed by: Sean Escalante  Date:    02/17/2025  Time:    14:45               Narrative:    EXAMINATION:  XR CHEST PA AND LATERAL    CLINICAL HISTORY:  Adventitious breath sounds;, .    COMPARISON:  12/27/2024    FINDINGS:  PA/AP and lateral views reveal the heart to be normal in size.  The trachea is just right of midline.  Atherosclerosis is seen within the aorta.  There is subtle nodularity again evident at the lateral right upper lobe.  There is interim increasing patchy density at the left lower lung.  No consolidative infiltrate or effusion is seen.  Degenerative changes are noted to the thoracic spine.                                       Medications   albuterol nebulizer solution 2.5 mg (2.5 mg Nebulization Given 2/17/25 1354)     Medical Decision Making  The patient is a 69 y.o. male with a pertinent PMHX of AFib on Xarelto,  "HTN, dm who presents to the Emergency Department with a chief complaint of cough, congestion. Symptoms began last Thursday and have been constant since onset. Associated symptoms include "whistling sound when he breathes", runny nose.  Symptoms are aggravated with nothing and alleviated with nothing. The patient denies chest pain, sore throat, ear pain, shortness of breath, nausea, vomiting, fever, abdominal pain, dysuria, COVID, RSV, pneumonia or flu vaccination, history of asthma, history of COPD, smoking. They report taking nothing prior to arrival with no relief of symptoms. No other reported symptoms at this time.    Pertinent physical exam findings include diffuse rhonchi, bilateral upper lobe rales, posterior oropharyngeal erythema, rhinorrhea with some turbinate edema.  Vital signs notable for HTN, otherwise stable.  Patient states that he took his blood pressure medication today.  Patient given nebulizer treatment while in the ER.    COVID, flu, RSV negative.  CXR Impression:  1. Subtle nodularity again evident at the lateral right upper lobe overlying the anterior right 2nd rib  2. Developing infiltrate and or atelectasis left lung base  3. Thoracic spondylosis    Laboratory and imaging findings discussed with patient.  Patient endorsing some improvement in symptoms after nebulizer treatment.  Antibiotics, Tessalon and nebulizers sent to pharmacy.  We will treat as pneumonia based off of patient's signs, imaging and physical exam findings.  Advised close PCP follow up.  Discharge instructions and strict return precautions provided. Patient verbalized understanding and agreement of plan. All questions answered.    Portions of this note have been created with voice recognition software. Occasional "wrong-words" or "sound alike" substitutions may have occurred due to inherent limitations of voice software. Please read the note carefully and recognize, using context, word substitutions may have occurred. "       Amount and/or Complexity of Data Reviewed  Labs: ordered. Decision-making details documented in ED Course.  Radiology: ordered. Decision-making details documented in ED Course.    Risk  Prescription drug management.      Additional MDM:   Differential Diagnosis:   Judging by the patient's chief complaint and pertinent history, the patient has the following possible differential diagnoses, including but not limited to the following:  COVID, flu, RSV, pneumonia, bronchitis  Some of these are deemed to be lower likelihood and some more likely based on my physical exam and history combined with possible lab work and/or imaging studies. Please see the pertinent studies, and refer to the HPI. Some of these diagnoses will take further evaluation to fully rule out, perhaps as an outpatient and the patient was encouraged to follow up when discharged for more comprehensive evaluation.                                       Clinical Impression:  Final diagnoses:  [J18.9] Pneumonia of left lower lobe due to infectious organism (Primary)          ED Disposition Condition    Discharge Stable          ED Prescriptions       Medication Sig Dispense Start Date End Date Auth. Provider    amoxicillin-clavulanate 875-125mg (AUGMENTIN) 875-125 mg per tablet Take 1 tablet by mouth 2 (two) times daily. for 5 days 10 tablet 2/17/2025 2/22/2025 Camelia Rojas PA-C    doxycycline (VIBRAMYCIN) 100 MG Cap Take 1 capsule (100 mg total) by mouth 2 (two) times a day. for 5 days 10 capsule 2/17/2025 2/22/2025 Camelia Rojas PA-C    albuterol (PROVENTIL) 2.5 mg /3 mL (0.083 %) nebulizer solution Take 3 mLs (2.5 mg total) by nebulization every 6 (six) hours as needed for Wheezing. Rescue 30 each 2/17/2025 3/19/2025 Camelia Rojas PA-C    benzonatate (TESSALON) 100 MG capsule Take 1 capsule (100 mg total) by mouth 3 (three) times daily as needed for Cough. 21 capsule 2/17/2025 2/24/2025 Camelia Rojas PA-C          Follow-up Information       Follow  up With Specialties Details Why Contact Info    Theresa Mcclain NP Family Medicine Call in 1 week As needed PO   [place] [address]  [phone]                   [1]   Social History  Tobacco Use    Smoking status: Never    Smokeless tobacco: Never   Substance Use Topics    Alcohol use: Not Currently    Drug use: Never        Camelia Rojas PA-C  02/17/25 1546

## 2025-02-17 NOTE — DISCHARGE INSTRUCTIONS
Take antibiotics as directed.  Use nebulizer as needed for wheezing.  Take Tessalon as needed for cough.  Stay well hydrated.  Follow up with your primary care doctor.  If you experience any new or worsening symptoms return to the emergency department.    Recommend getting updated on your influenza, COVID, pneumonia, RSV vaccines.  You may talk to your primary care doctor about these.    Follow up with your primary care doctor regarding the nodularity at your lateral right upper lobe of your lung overlying the anterior right 2nd rib.

## 2025-03-25 ENCOUNTER — ANESTHESIA (OUTPATIENT)
Dept: ENDOSCOPY | Facility: HOSPITAL | Age: 70
End: 2025-03-25
Payer: MEDICARE

## 2025-03-25 ENCOUNTER — HOSPITAL ENCOUNTER (OUTPATIENT)
Facility: HOSPITAL | Age: 70
Discharge: HOME | End: 2025-03-25
Attending: INTERNAL MEDICINE | Admitting: INTERNAL MEDICINE
Payer: MEDICARE

## 2025-03-25 ENCOUNTER — ANESTHESIA EVENT (OUTPATIENT)
Dept: ENDOSCOPY | Facility: HOSPITAL | Age: 70
End: 2025-03-25
Payer: MEDICARE

## 2025-03-25 VITALS
TEMPERATURE: 97 F | RESPIRATION RATE: 20 BRPM | BODY MASS INDEX: 26.79 KG/M2 | SYSTOLIC BLOOD PRESSURE: 144 MMHG | WEIGHT: 220 LBS | HEART RATE: 68 BPM | OXYGEN SATURATION: 100 % | DIASTOLIC BLOOD PRESSURE: 92 MMHG | HEIGHT: 76 IN

## 2025-03-25 DIAGNOSIS — Z80.0 FAMILY HISTORY OF COLON CANCER: Primary | ICD-10-CM

## 2025-03-25 DIAGNOSIS — M25.511 RIGHT SHOULDER PAIN: Primary | ICD-10-CM

## 2025-03-25 DIAGNOSIS — R19.5 ABNORMAL FECES: ICD-10-CM

## 2025-03-25 LAB — POCT GLUCOSE: 163 MG/DL (ref 70–110)

## 2025-03-25 PROCEDURE — 25000003 PHARM REV CODE 250

## 2025-03-25 PROCEDURE — 37000009 HC ANESTHESIA EA ADD 15 MINS: Performed by: INTERNAL MEDICINE

## 2025-03-25 PROCEDURE — D9220A PRA ANESTHESIA: Mod: PT,CRNA,,

## 2025-03-25 PROCEDURE — D9220A PRA ANESTHESIA: Mod: PT,ANES,, | Performed by: ANESTHESIOLOGY

## 2025-03-25 PROCEDURE — 63600175 PHARM REV CODE 636 W HCPCS

## 2025-03-25 PROCEDURE — 37000008 HC ANESTHESIA 1ST 15 MINUTES: Performed by: INTERNAL MEDICINE

## 2025-03-25 PROCEDURE — 27201423 OPTIME MED/SURG SUP & DEVICES STERILE SUPPLY: Performed by: INTERNAL MEDICINE

## 2025-03-25 PROCEDURE — 45380 COLONOSCOPY AND BIOPSY: CPT | Mod: 59 | Performed by: INTERNAL MEDICINE

## 2025-03-25 PROCEDURE — 88305 TISSUE EXAM BY PATHOLOGIST: CPT | Performed by: INTERNAL MEDICINE

## 2025-03-25 PROCEDURE — 45385 COLONOSCOPY W/LESION REMOVAL: CPT | Performed by: INTERNAL MEDICINE

## 2025-03-25 RX ORDER — GLUCAGON 1 MG
1 KIT INJECTION
Status: DISCONTINUED | OUTPATIENT
Start: 2025-03-25 | End: 2025-03-25 | Stop reason: HOSPADM

## 2025-03-25 RX ORDER — ONDANSETRON HYDROCHLORIDE 2 MG/ML
4 INJECTION, SOLUTION INTRAVENOUS ONCE AS NEEDED
Status: DISCONTINUED | OUTPATIENT
Start: 2025-03-25 | End: 2025-03-25 | Stop reason: HOSPADM

## 2025-03-25 RX ORDER — LIDOCAINE HYDROCHLORIDE 10 MG/ML
1 INJECTION, SOLUTION EPIDURAL; INFILTRATION; INTRACAUDAL; PERINEURAL ONCE
Status: DISCONTINUED | OUTPATIENT
Start: 2025-03-25 | End: 2025-03-25 | Stop reason: HOSPADM

## 2025-03-25 RX ORDER — LIDOCAINE HYDROCHLORIDE 20 MG/ML
INJECTION INTRAVENOUS
Status: DISCONTINUED | OUTPATIENT
Start: 2025-03-25 | End: 2025-03-25

## 2025-03-25 RX ORDER — PROPOFOL 10 MG/ML
VIAL (ML) INTRAVENOUS
Status: DISCONTINUED | OUTPATIENT
Start: 2025-03-25 | End: 2025-03-25

## 2025-03-25 RX ORDER — SODIUM CHLORIDE, SODIUM GLUCONATE, SODIUM ACETATE, POTASSIUM CHLORIDE AND MAGNESIUM CHLORIDE 30; 37; 368; 526; 502 MG/100ML; MG/100ML; MG/100ML; MG/100ML; MG/100ML
INJECTION, SOLUTION INTRAVENOUS CONTINUOUS
Status: DISCONTINUED | OUTPATIENT
Start: 2025-03-25 | End: 2025-03-25 | Stop reason: HOSPADM

## 2025-03-25 RX ADMIN — SODIUM CHLORIDE, SODIUM GLUCONATE, SODIUM ACETATE, POTASSIUM CHLORIDE AND MAGNESIUM CHLORIDE: 526; 502; 368; 37; 30 INJECTION, SOLUTION INTRAVENOUS at 01:03

## 2025-03-25 RX ADMIN — LIDOCAINE HYDROCHLORIDE 20 MG: 20 INJECTION INTRAVENOUS at 01:03

## 2025-03-25 RX ADMIN — PROPOFOL 40 MG: 10 INJECTION, EMULSION INTRAVENOUS at 01:03

## 2025-03-25 RX ADMIN — PROPOFOL 120 MCG/KG/MIN: 10 INJECTION, EMULSION INTRAVENOUS at 01:03

## 2025-03-25 NOTE — ANESTHESIA PREPROCEDURE EVALUATION
03/25/2025  Mitchell Jauregui is a 69 y.o., male, who presents for the following:    Procedure: COLON (Abdomen)   Anesthesia type: General/MAC   Diagnosis:      Abnormal feces [R19.5]      Family history of colon cancer [Z80.0]   Location: [facility] ENDO 01 / [facility] ENDOSCOPY   Surgeons: Tommy Markham MD     Past Medical History:   Diagnosis Date    Diabetes mellitus     Hypertension     Mixed hyperlipidemia        Xarelto:  Last dose 3/22    TTE (2017) :     - Normal LV cavity, EF 45%   - mild MR,  trace TR   - rhythm AFib    Pre-op Assessment    I have reviewed the Patient Summary Reports.     I have reviewed the Nursing Notes. I have reviewed the NPO Status.   I have reviewed the Medications.     Review of Systems  Anesthesia Hx:  No problems with previous Anesthesia             Denies Family Hx of Anesthesia complications.    Denies Personal Hx of Anesthesia complications.                    Social:  Non-Smoker       Cardiovascular:     Hypertension           hyperlipidemia    A FIB Hx                           Pulmonary:  Pulmonary Normal                       Endocrine:  Diabetes               Physical Exam  General: Alert and Oriented    Airway:  Mallampati: II   Mouth Opening: Normal  TM Distance: Normal  Tongue: Normal  Neck ROM: Normal ROM    Dental:  Intact  Missing most his upper teeth / lower teeth intact with normal age-related wear  Chest/Lungs:  Normal Respiratory Rate    Heart:  Rate: Normal  Rhythm: Regular Rhythm        Anesthesia Plan  Type of Anesthesia, risks & benefits discussed:    Anesthesia Type: Gen Natural Airway  Intra-op Monitoring Plan: Standard ASA Monitors  Post Op Pain Control Plan: IV/PO Opioids PRN  Induction:  IV  Airway Plan: Direct  Informed Consent: Informed consent signed with the Patient and all parties understand the risks and agree with anesthesia plan.  All  questions answered. Patient consented to blood products? No  ASA Score: 3  Day of Surgery Review of History & Physical: H&P Update referred to the surgeon/provider.  Anesthesia Plan Notes: Nasal cannula vs facemask supplemental oxygenation         Ready For Surgery From Anesthesia Perspective.     .

## 2025-03-25 NOTE — PROCEDURES
Mitchell Jauregui   MRN: [id number]   ADMISSION DATE: 3/25/2025  : 1955  AGE: 69 y.o.    PROCEDURE:  Colonoscopy with snare polypectomy    DATE OF PROCEDURE:  2025     SURGEON: LEATHA MARLEY    PREOPERATIVE DIAGNOSIS:  Family history of colon cancer    POSTOPERATIVE DIAGNOSIS:  1. Pancolonic diverticula.    2. Prominent fold versus polypoid lesion, proximal descending colon.  Multiple biopsies obtained.  Endoclips x3 were placed secondary to mucosal bleeding.  3. Sigmoid colon polyp, 30 cm, approximately 10-12 mm.  Hot snare polypectomy/Endoclip x1 was done.    4. Internal hemorrhoids, grade 1-2.  Otherwise normal exam      HISTORY AND PHYSICAL:  As per preoperative note.      DESCRIPTION OF PROCEDURE:  Informed consent was obtained.  Patient was placed in left lateral position.  Sedation per anesthesia service.  Rectal exam was unremarkable.  Olympus video colonoscope was introduced into the rectum and was carefully advanced to cecum.  Quality of the preparation was satisfactory after irrigation.  Patient tolerated the procedure well without any complications.    FINDINGS:  There were multiple diverticula noted scattered throughout the colon.  Photodocumentation was obtained.  There was a small polyp, 3-4 mm noted around splenic flexure.  Cold snare polypectomy was done.  There was a prominent fold versus polypoid lesion noted around proximal descending colon.  It was approximately 6-7 mm.  Multiple biopsies were obtained.  There was a mucosal oozing noted from the polypectomy site.  Endoclip x3 were placed with good hemostasis.  There was a 10-12 mm multilobulated polypoid lesion noted at approximately 30 cm from the anal verge in the sigmoid colon.  Hot snare polypectomy was done.  Endoclip x1 (prophylactic) was placed at the base as patient needs to go back on Xarelto in next couple of days.  There were grade 1-2 internal hemorrhoids noted on withdrawal of the scope.  Estimated withdrawal time from  cecum to rectum was more than 15 minutes.    ESTIMATED BLOOD LOSS:  7-8 cc    COMPLICATIONS:  None    RECOMMENDATIONS:  1. Follow up biopsy results.  Hold Xarelto for 48-72 hours.  Can resume after if no evidence of GI bleed.    3. Patient to follow up with me as scheduled.  4. Avoid NSAIDs for 2 weeks  5. Liquid diet today.  Advance tomorrow as tolerated.

## 2025-03-25 NOTE — DISCHARGE SUMMARY
[facility]  Discharge Note  Short Stay    Procedure(s) (LRB):  COLON (N/A)  COLONOSCOPY, WITH POLYPECTOMY USING SNARE  COLONOSCOPY, WITH 1 OR MORE BIOPSIES  COLONOSCOPY, WITH POLYPECTOMY USING HOT BIOPSY FORCEPS      OUTCOME: Patient tolerated treatment/procedure well without complication and is now ready for discharge.    DISPOSITION: Home or Self Care    FINAL DIAGNOSIS:  Family history of colon cancer    FOLLOWUP: In clinic    DISCHARGE INSTRUCTIONS:    Discharge Procedure Orders   Diet general      Hold Xarelto for next 2-3 days.  Can resume Xarelto on 03/28/2025 (Friday) if no evidence of GI bleed.      TIME SPENT ON DISCHARGE: 10 minutes

## 2025-03-25 NOTE — TRANSFER OF CARE
"Anesthesia Transfer of Care Note    Patient: Mitchell Jauregui    Procedure(s) Performed: Procedure(s) (LRB):  COLON (N/A)  COLONOSCOPY, WITH POLYPECTOMY USING SNARE  COLONOSCOPY, WITH 1 OR MORE BIOPSIES  COLONOSCOPY, WITH POLYPECTOMY USING HOT BIOPSY FORCEPS    Patient location: GI    Anesthesia Type: MAC    Transport from OR: Transported from OR on room air with adequate spontaneous ventilation    Post pain: adequate analgesia    Post assessment: no apparent anesthetic complications    Post vital signs: stable    Level of consciousness: awake    Nausea/Vomiting: no nausea/vomiting    Complications: none    Transfer of care protocol was followed      Last vitals: Visit Vitals  /72 (BP Location: Left arm, Patient Position: Lying)   Pulse (!) 57   Temp 36.5 °C (97.7 °F) (Temporal)   Resp 17   Ht 6' 4" (1.93 m)   Wt 99.8 kg (220 lb)   SpO2 97%   BMI 26.78 kg/m²     "

## 2025-03-25 NOTE — ANESTHESIA POSTPROCEDURE EVALUATION
Anesthesia Post Evaluation    Patient: Mitchell Jauregui    Procedure(s) Performed: Procedure(s) (LRB):  COLON (N/A)  COLONOSCOPY, WITH POLYPECTOMY USING SNARE  COLONOSCOPY, WITH 1 OR MORE BIOPSIES  COLONOSCOPY, WITH POLYPECTOMY USING HOT BIOPSY FORCEPS    Final Anesthesia Type: general      Patient location during evaluation: GI PACU  Patient participation: Yes- Able to Participate  Level of consciousness: oriented and awake  Post-procedure vital signs: reviewed and stable  Pain management: adequate  Airway patency: patent    PONV status at discharge: No PONV  Anesthetic complications: no      Cardiovascular status: stable and hemodynamically stable  Respiratory status: spontaneous ventilation and unassisted  Hydration status: euvolemic  Follow-up not needed.  Comments: [facility]              Vitals Value Taken Time   /92 03/25/25 14:44   Temp 36.3 °C (97.3 °F) 03/25/25 14:25   Pulse 68 03/25/25 14:44   Resp 20 03/25/25 14:44   SpO2 100 % 03/25/25 14:44         No case tracking events are documented in the log.      Pain/Aurelia Score: Aurelia Score: 10 (3/25/2025  2:44 PM)

## 2025-03-26 ENCOUNTER — HOSPITAL ENCOUNTER (EMERGENCY)
Facility: HOSPITAL | Age: 70
Discharge: HOME | End: 2025-03-26
Attending: EMERGENCY MEDICINE
Payer: MEDICARE

## 2025-03-26 VITALS
DIASTOLIC BLOOD PRESSURE: 85 MMHG | WEIGHT: 220 LBS | SYSTOLIC BLOOD PRESSURE: 135 MMHG | TEMPERATURE: 98 F | BODY MASS INDEX: 26.79 KG/M2 | RESPIRATION RATE: 18 BRPM | HEART RATE: 85 BPM | OXYGEN SATURATION: 100 % | HEIGHT: 76 IN

## 2025-03-26 DIAGNOSIS — S29.012A STRAIN OF THORACIC BACK REGION: Primary | ICD-10-CM

## 2025-03-26 LAB — PSYCHE PATHOLOGY RESULT: NORMAL

## 2025-03-26 PROCEDURE — 99283 EMERGENCY DEPT VISIT LOW MDM: CPT | Mod: 25

## 2025-03-26 PROCEDURE — 25000003 PHARM REV CODE 250: Performed by: EMERGENCY MEDICINE

## 2025-03-26 RX ORDER — ACETAMINOPHEN 500 MG
1000 TABLET ORAL
Status: COMPLETED | OUTPATIENT
Start: 2025-03-26 | End: 2025-03-26

## 2025-03-26 RX ADMIN — ACETAMINOPHEN 1000 MG: 500 TABLET ORAL at 01:03

## 2025-03-26 NOTE — ED PROVIDER NOTES
Encounter Date: 3/26/2025       History     Chief Complaint   Patient presents with    Back Pain     C/o mid/ upper back pain x 3 weeks. Denies injury.      HPI  A 69-year-old male history of diabetes, hypertension, HLD complaining of midthoracic back pain x3 weeks with no obvious traumatic injury.  Patient denies associated motor or sensory loss, incontinence or saddle anesthesia.  Patient also denies headache, neck pain, substernal chest pain, shortness of breath, abdominal pain, dysuria or hematuria.  Patient has not taken any medication for the pain.  He states the pain is worse with movement of his upper torso and relieved by rest.  Review of patient's allergies indicates:  No Known Allergies  Past Medical History:   Diagnosis Date    Diabetes mellitus     Hypertension     Mixed hyperlipidemia      Past Surgical History:   Procedure Laterality Date    BACK SURGERY      CHOLECYSTECTOMY      COLONOSCOPY N/A 3/25/2025    Procedure: COLON;  Surgeon: Tommy Markham MD;  Location: [facility] ENDOSCOPY;  Service: Gastroenterology;  Laterality: N/A;    COLONOSCOPY, WITH 1 OR MORE BIOPSIES  3/25/2025    Procedure: COLONOSCOPY, WITH 1 OR MORE BIOPSIES;  Surgeon: Tommy Markham MD;  Location: [facility] ENDOSCOPY;  Service: Gastroenterology;;    COLONOSCOPY, WITH POLYPECTOMY USING HOT BIOPSY FORCEPS  3/25/2025    Procedure: COLONOSCOPY, WITH POLYPECTOMY USING HOT BIOPSY FORCEPS;  Surgeon: Tommy Markham MD;  Location: [facility] ENDOSCOPY;  Service: Gastroenterology;;    COLONOSCOPY, WITH POLYPECTOMY USING SNARE  3/25/2025    Procedure: COLONOSCOPY, WITH POLYPECTOMY USING SNARE;  Surgeon: Tommy Markham MD;  Location: [facility] ENDOSCOPY;  Service: Gastroenterology;;    hemorrhoid ectomy       Family History   Family history unknown: Yes     Social History[1]  Review of Systems   All other systems reviewed and are negative.      Physical Exam     Initial Vitals [03/26/25 1315]   BP Pulse Resp Temp SpO2   138/77 75 20 98 °F (36.7  °C) 96 %      MAP       --         Physical Exam    Nursing note and vitals reviewed.  Constitutional: He appears well-developed. He is cooperative.   HENT:   Head: Normocephalic and atraumatic.   Nose: Nose normal. Mouth/Throat: Oropharynx is clear and moist.   Eyes: Conjunctivae and lids are normal.   Neck: Trachea normal. Neck supple.   Normal range of motion.  Cardiovascular:  Normal rate, regular rhythm, normal heart sounds and intact distal pulses.           Pulmonary/Chest: Breath sounds normal.   Abdominal: Abdomen is soft. Bowel sounds are normal. There is no abdominal tenderness.   Musculoskeletal:         General: Tenderness present.      Cervical back: Normal range of motion and neck supple.      Comments: Tender to palpation over midthoracic spine paraspinal greater than right T2-T4 with no vertebral step-offs      Neurological: He is alert and oriented to person, place, and time. He has normal strength. GCS score is 15. GCS eye subscore is 4. GCS verbal subscore is 5. GCS motor subscore is 6.   Skin: Skin is warm and dry. No rash noted.   Psychiatric: He has a normal mood and affect. His speech is normal and behavior is normal. Judgment and thought content normal.         ED Course   Procedures  Labs Reviewed - No data to display       Imaging Results              X-Ray Thoracic Spine AP Lateral (Final result)  Result time 03/26/25 14:09:04      Final result by Sean Escalante MD (03/26/25 14:09:04)                   Impression:      1. No acute osseous defect identified  2. Thoracic spondylosis and slight scoliosis  3. Mild osteopenia      Electronically signed by: Sean Escalante  Date:    03/26/2025  Time:    14:09               Narrative:    EXAMINATION:  THORACIC SPINE:    CLINICAL HISTORY:  , back pain;    COMPARISON:  None available    FINDINGS:  AP and lateral views reveal visualized vertebral body height and alignment to be intact.  The uppermost thoracic spine is suboptimally visualized on  the lateral view.  Disc space narrowing and osteophyte formation is seen.  Twelve rib-bearing thoracic vertebral bodies are identified.  There is slight curvature of the thoracic spine with the convexity to right.  Bony structures are osteopenic.                        Wet Read by Juventino Cetneno MD (03/26/25 13:49:23, [facility] - Emergency Dept, Emergency Medicine)    No obvious fracture or dislocation, DJD                                     Medications   acetaminophen tablet 1,000 mg (1,000 mg Oral Given 3/26/25 1342)     Medical Decision Making  Amount and/or Complexity of Data Reviewed  Radiology: ordered and independent interpretation performed.    Risk  OTC drugs.    61-year-old male complaining of midthoracic back pain x3 weeks with no history of injury.  Patient is tender to palpation over his thoracic spine paraspinal with no vertebral step-offs or midline pain.  Patient with nonfocal neuro exam.  Patient with no complaints of focal motor or sensory changes.  Thoracic spine x-rays with no obvious fractures or other abnormalities.  Patient received Tylenol 1 g p.o. here and is feeling much better and will be discharged home with Tylenol for pain, warm compresses to affected area, Salonpas to affected area, close follow up with PCP in 2 days for recheck, return for worsening symptoms or any other concerns.                                  Clinical Impression:  Final diagnoses:  [S29.012A] Strain of thoracic back region (Primary)          ED Disposition Condition    Discharge           ED Prescriptions    None       Follow-up Information       Follow up With Specialties Details Why Contact Info    Theresa Mcclain NP Family Medicine Schedule an appointment as soon as possible for a visit in 1 day  PO   [facility] [address]  [phone]                   [1]   Social History  Tobacco Use    Smoking status: Never    Smokeless tobacco: Never   Substance Use Topics    Alcohol use: Not  Currently    Drug use: Never        Juventino Centeno MD  03/27/25 0653

## 2025-03-26 NOTE — DISCHARGE INSTRUCTIONS
Use warm compresses to affected area 2 times a day, use Lidoderm patches to affected area as needed, Tylenol for pain, follow up with your primary care physician in 1-2 days for recheck, return for worsening symptoms or any other concerns

## 2025-04-01 ENCOUNTER — HOSPITAL ENCOUNTER (OUTPATIENT)
Dept: RADIOLOGY | Facility: CLINIC | Age: 70
Discharge: HOME | End: 2025-04-01
Attending: ORTHOPAEDIC SURGERY
Payer: MEDICARE

## 2025-04-01 ENCOUNTER — OFFICE VISIT (OUTPATIENT)
Dept: ORTHOPEDICS | Facility: CLINIC | Age: 70
End: 2025-04-01
Payer: MEDICARE

## 2025-04-01 VITALS
BODY MASS INDEX: 27.03 KG/M2 | DIASTOLIC BLOOD PRESSURE: 80 MMHG | SYSTOLIC BLOOD PRESSURE: 121 MMHG | HEIGHT: 76 IN | HEART RATE: 64 BPM | WEIGHT: 222 LBS

## 2025-04-01 DIAGNOSIS — M25.511 RIGHT SHOULDER PAIN: ICD-10-CM

## 2025-04-01 DIAGNOSIS — M75.121 NONTRAUMATIC COMPLETE TEAR OF RIGHT ROTATOR CUFF: Primary | ICD-10-CM

## 2025-04-01 PROCEDURE — 73030 X-RAY EXAM OF SHOULDER: CPT | Mod: RT,,, | Performed by: ORTHOPAEDIC SURGERY

## 2025-04-01 RX ORDER — ROSUVASTATIN CALCIUM 40 MG/1
40 TABLET, COATED ORAL
COMMUNITY
Start: 2025-02-21

## 2025-04-01 NOTE — PROGRESS NOTES
Subjective:      Patient ID: Mitchell Jauregui is a 69 y.o. male.    Chief Complaint: Pain of the Right Shoulder ((RHD) Right Shoulder RTC patient states his RTC is gone. MRI in chart 3/31/25. No injury just happened over time from work. He did PT once and they showed him how to lift things properly to relief pain. He has to use other arm to help brace.  )    HPI:     History of Present Illness    CHIEF COMPLAINT:  - Right shoulder pain with known rotator cuff tear.    HPI:  Mitchell presents for evaluation of right shoulder pain. He reports having an MRI of the shoulder approximately one year ago at a regional facility. His right shoulder pain is severe, as indicated by this provider at [facility] who stated that he needs a rotator cuff repair and possibly a shoulder replacement. He is concerned about his right-handedness and the importance of this arm for his daily activities. He mentions some numbness in his thumb. He is retired.    He denies any recent trauma or injury to the shoulder area.    IMAGING:  - MRI Right Shoulder: 1 year ago, revealed a complete rotator cuff tear  - XR Right Shoulder: showed possible rotator cuff deficiency    WORK STATUS:  - Retired  - Previously worked in [employer] for 41 years    SOCIAL HISTORY:  - Living in [employer] for 41 years  - Originally from [place]      ROS:  Musculoskeletal: +joint pain, +pain with movement          Past Medical History:   Diagnosis Date    Diabetes mellitus     Hypertension     Mixed hyperlipidemia      Past Surgical History:   Procedure Laterality Date    BACK SURGERY      CHOLECYSTECTOMY      COLONOSCOPY N/A 3/25/2025    Procedure: COLON;  Surgeon: Tommy Markham MD;  Location: [facility] ENDOSCOPY;  Service: Gastroenterology;  Laterality: N/A;    COLONOSCOPY, WITH 1 OR MORE BIOPSIES  3/25/2025    Procedure: COLONOSCOPY, WITH 1 OR MORE BIOPSIES;  Surgeon: Tommy Markham MD;  Location: [facility] ENDOSCOPY;  Service: Gastroenterology;;    COLONOSCOPY, WITH POLYPECTOMY USING HOT  "BIOPSY FORCEPS  3/25/2025    Procedure: COLONOSCOPY, WITH POLYPECTOMY USING HOT BIOPSY FORCEPS;  Surgeon: Tommy Markham MD;  Location: [facility] ENDOSCOPY;  Service: Gastroenterology;;    COLONOSCOPY, WITH POLYPECTOMY USING SNARE  3/25/2025    Procedure: COLONOSCOPY, WITH POLYPECTOMY USING SNARE;  Surgeon: Tommy Markham MD;  Location: [facility] ENDOSCOPY;  Service: Gastroenterology;;    hemorrhoid ectomy       Social History[1]    Current Medications[2]  Review of patient's allergies indicates:  No Known Allergies    /80 (BP Location: Left arm, Patient Position: Sitting)   Pulse 64   Ht 6' 4" (1.93 m)   Wt 100.7 kg (222 lb)   BMI 27.02 kg/m²     Comprehensive review of systems completed and negative except as per HPI.        Objective:   General: Well-developed, well-nourished.  Neuro: Alert and oriented x 3.  Psych: Normal mood and affect.  Card: Regular rate and rhythm  Resp: Respirations regular and unlabored    Shoulder Exam:  No obvious deformity. No medial or lateral scapula winging. Forward flexion to 140 degrees and abduction to 140 degrees. Positive empty can,  positive Whipple, Positive drop arm test, Correa, impingement, Positive AC joint tenderness. Negative biceps groove tenderness, Positive Flanagan´s, Yergason´s, Speed test. Negative Apprehension and Relocation test. 4/5 strength, normal skin appearance and palpable pulses distally. Sensibility normal.        Assessment:         1. Nontraumatic complete tear of right rotator cuff          Plan:       Orders Placed This Encounter    X-ray Shoulder 2 or More Views Right        Imaging and exam findings discussed.     Assessment & Plan    PROCEDURES:  - # Procedures  - Discussed obtaining an updated MRI of the right shoulder to assess the current condition of the rotator cuff.  - Potential treatment options based on MRI results: rotator cuff tendon repair if repairable, or shoulder replacement if not repairable.  - Risks and benefits of both " procedures, including hospital stay duration and post-op limitations.    IMAGING:  - Ordered MRI Right Shoulder.    FOLLOW UP:  - Follow up after MRI.    PATIENT INSTRUCTIONS:  - Avoid lifting more than 10 lbs with the affected upper extremity after potential shoulder replacement.               All questions were answered. Patient happy and in agreement with the plan.     This note was generated with the assistance of ambient listening technology. Verbal consent was obtained by the patient and accompanying visitor(s) for the recording of patient appointment to facilitate this note. I attest to having reviewed and edited the generated note for accuracy, though some syntax or spelling errors may persist. Please contact the author of this note for any clarification.           [1]   Social History  Socioeconomic History    Marital status: Single   Tobacco Use    Smoking status: Never    Smokeless tobacco: Never   Substance and Sexual Activity    Alcohol use: Not Currently    Drug use: Never   [2]   Current Outpatient Medications:     amLODIPine (NORVASC) 10 MG tablet, Take 10 mg by mouth., Disp: , Rfl:     ascorbic Acid (VITAMIN C) 500 mg CpSR, Take 500 mg by mouth., Disp: , Rfl:     calcium carbonate (TUMS) 300 mg (750 mg) Chew, Take 300 mg by mouth., Disp: , Rfl:     ferrous sulfate (FEOSOL) 325 mg (65 mg iron) Tab tablet, Take 65 mg by mouth., Disp: , Rfl:     glipiZIDE (GLUCOTROL) 5 MG tablet, , Disp: , Rfl:     LORazepam (ATIVAN) 2 MG Tab, Take 2 mg by mouth 2 (two) times daily as needed., Disp: , Rfl:     metoprolol succinate (TOPROL-XL) 25 MG 24 hr tablet, Take 25 mg by mouth., Disp: , Rfl:     rosuvastatin (CRESTOR) 40 MG Tab, Take 40 mg by mouth., Disp: , Rfl:     vitamin A 8000 UNIT capsule, Take 8,000 Units by mouth., Disp: , Rfl:     XARELTO 20 mg Tab, Take 20 mg by mouth., Disp: , Rfl:     albuterol (PROVENTIL) 2.5 mg /3 mL (0.083 %) nebulizer solution, Take 3 mLs (2.5 mg total) by nebulization every 6  (six) hours as needed for Wheezing. Rescue, Disp: 30 each, Rfl: 3    dexbrompheniramine maleate (ALA-HIST IR) 2 mg Tab, Ala-Hist IR 2 mg tablet  Take 1 tablet 3 times a day by oral route as needed. (Patient not taking: Reported on 4/1/2025), Disp: , Rfl:     XARELTO 10 mg Tab, , Disp: , Rfl:

## 2025-04-06 ENCOUNTER — HOSPITAL ENCOUNTER (EMERGENCY)
Facility: HOSPITAL | Age: 70
Discharge: HOME | End: 2025-04-06
Attending: EMERGENCY MEDICINE
Payer: MEDICARE

## 2025-04-06 VITALS
SYSTOLIC BLOOD PRESSURE: 152 MMHG | WEIGHT: 225.5 LBS | RESPIRATION RATE: 18 BRPM | OXYGEN SATURATION: 99 % | HEART RATE: 62 BPM | HEIGHT: 76 IN | DIASTOLIC BLOOD PRESSURE: 86 MMHG | BODY MASS INDEX: 27.46 KG/M2 | TEMPERATURE: 98 F

## 2025-04-06 DIAGNOSIS — G47.00 INSOMNIA, UNSPECIFIED TYPE: Primary | ICD-10-CM

## 2025-04-06 PROCEDURE — 99283 EMERGENCY DEPT VISIT LOW MDM: CPT

## 2025-04-06 PROCEDURE — 25000003 PHARM REV CODE 250: Performed by: EMERGENCY MEDICINE

## 2025-04-06 RX ORDER — HYDROXYZINE PAMOATE 25 MG/1
25 CAPSULE ORAL NIGHTLY PRN
Qty: 10 CAPSULE | Refills: 0 | Status: SHIPPED | OUTPATIENT
Start: 2025-04-06

## 2025-04-06 RX ORDER — HYDROXYZINE PAMOATE 25 MG/1
25 CAPSULE ORAL
Status: COMPLETED | OUTPATIENT
Start: 2025-04-06 | End: 2025-04-06

## 2025-04-06 RX ADMIN — HYDROXYZINE PAMOATE 25 MG: 25 CAPSULE ORAL at 10:04

## 2025-04-07 NOTE — DISCHARGE INSTRUCTIONS
It is your responsibility to obtain follow up care as instructed. It is your responsibility to call the clinic, specialist, or primary care physician you are referred to for follow-up care and make an appointment. When you call to make an appointment, advise the  that you were referred by the emergency department.    If you become concerned about your condition and are unable to contact your physician, or if your condition is a medical emergency, return to the emergency department.

## 2025-04-07 NOTE — ED PROVIDER NOTES
Encounter Date: 4/6/2025       History     Chief Complaint   Patient presents with    Insomnia     C/O inability to sleep last pm. States taken Ativan as prescribed at bedtime w/o relief. Denies know reason for restlessness. Skin w/d. Resp e/u. NADN     The patient presents with insomnia.  Last night he took his usual 4 mg of Ativan at bedtime but was unable to sleep.  He was not hurting.  He did not have racing thoughts.  He has been on 4 mg of Ativan nightly for quite some time through his PCP.  He takes no stimulants.        Review of patient's allergies indicates:  No Known Allergies  Past Medical History:   Diagnosis Date    Diabetes mellitus     Hypertension     Mixed hyperlipidemia      Past Surgical History:   Procedure Laterality Date    BACK SURGERY      CHOLECYSTECTOMY      COLONOSCOPY N/A 3/25/2025    Procedure: COLON;  Surgeon: Tommy Markham MD;  Location: [facility] ENDOSCOPY;  Service: Gastroenterology;  Laterality: N/A;    COLONOSCOPY, WITH 1 OR MORE BIOPSIES  3/25/2025    Procedure: COLONOSCOPY, WITH 1 OR MORE BIOPSIES;  Surgeon: Tommy Markham MD;  Location: [facility] ENDOSCOPY;  Service: Gastroenterology;;    COLONOSCOPY, WITH POLYPECTOMY USING HOT BIOPSY FORCEPS  3/25/2025    Procedure: COLONOSCOPY, WITH POLYPECTOMY USING HOT BIOPSY FORCEPS;  Surgeon: Tommy Markham MD;  Location: [facility] ENDOSCOPY;  Service: Gastroenterology;;    COLONOSCOPY, WITH POLYPECTOMY USING SNARE  3/25/2025    Procedure: COLONOSCOPY, WITH POLYPECTOMY USING SNARE;  Surgeon: Tommy Markham MD;  Location: [facility] ENDOSCOPY;  Service: Gastroenterology;;    hemorrhoid ectomy       Family History   Family history unknown: Yes     Social History[1]  Review of Systems   All other systems reviewed and are negative.      Physical Exam     Initial Vitals [04/06/25 2144]   BP Pulse Resp Temp SpO2   (!) 161/90 (!) 58 18 98.3 °F (36.8 °C) 97 %      MAP       --         Physical Exam    Constitutional:   Vital signs reviewed.  Nursing  note reviewed.    General:  The patient is awake and alert, appropriate and interactive.    Eyes: Conjunctiva are clear.  Corneas appear normal.  EOMI.  ENT: Face, head, external nose, and ears are normal-appearing.  The oropharynx appears normal.  The uvula is midline.  The posterior pharyngeal elements are symmetric.  Voice is normal.  Mucous membranes moist.  Neck: The neck is nontender and supple with normal range of motion.  Back: The back appears normal with full range of motion.  Respiratory: The respiratory effort is normal.  The lungs are clear to auscultation.  Cardiovascular: Heart sounds are normal.  No murmur or rub.  The rate is normal.  The rhythm is normal.  Peripheral pulses are normal and equal bilaterally.  No edema is present.  Capillary refill is less than 3 seconds.  GI: The abdomen is soft, nondistended, without mass.  There is no tenderness to palpation.  No rebound or guarding.  Bowel sounds are normal.  The liver and spleen are nonpalpable.  Musculoskeletal: Range of motion of all joints appears normal without pain or tenderness.  Skin: There is no rash.  Neurologic: No focal deficits are noted.           ED Course   Procedures  Labs Reviewed - No data to display       Imaging Results    None          Medications   hydrOXYzine pamoate capsule 25 mg (has no administration in time range)     Medical Decision Making  The plan is to treat him symptomatically him follow up with his PCP.    Risk  Prescription drug management.                                      Clinical Impression:  Final diagnoses:  [G47.00] Insomnia, unspecified type (Primary)          ED Disposition Condition    Discharge Stable          ED Prescriptions       Medication Sig Dispense Start Date End Date Auth. Provider    hydrOXYzine pamoate (VISTARIL) 25 MG Cap Take 1 capsule (25 mg total) by mouth nightly as needed (insomnia). 10 capsule 4/6/2025 -- Davey Yan MD          Follow-up Information       Follow up With  Specialties Details Why Contact Info    Theresa Mcclain NP Family Medicine  Follow up within 7 days for recheck. PO   [facility] [address]  [phone]                 [1]   Social History  Tobacco Use    Smoking status: Never    Smokeless tobacco: Never   Substance Use Topics    Alcohol use: Not Currently    Drug use: Never        Davey Yan MD  04/06/25 6785

## 2025-04-08 ENCOUNTER — OFFICE VISIT (OUTPATIENT)
Dept: ORTHOPEDICS | Facility: CLINIC | Age: 70
End: 2025-04-08
Payer: MEDICARE

## 2025-04-08 VITALS
BODY MASS INDEX: 27.4 KG/M2 | HEART RATE: 73 BPM | DIASTOLIC BLOOD PRESSURE: 77 MMHG | HEIGHT: 76 IN | SYSTOLIC BLOOD PRESSURE: 119 MMHG | WEIGHT: 225 LBS

## 2025-04-08 DIAGNOSIS — M75.121 NONTRAUMATIC COMPLETE TEAR OF RIGHT ROTATOR CUFF: Primary | ICD-10-CM

## 2025-04-08 NOTE — PROGRESS NOTES
Subjective:      Patient ID: Mitchell Jauregui is a 69 y.o. male.    Chief Complaint: Results of the Right Shoulder (MRI 4/4/25 Right Shoulder Results )    HPI:     History of Present Illness    CHIEF COMPLAINT:  - Right shoulder pain and decreased range of motion    HPI:  Mitchell presents for evaluation of right shoulder pain following a recent MRI. Pain varies depending on activities, particularly when stretching or performing overhead tasks. He reports difficulty using a nail gun for framing work above his head and felt discomfort while painting his ceiling. He can perform most activities as long as they are close to his body. He can sleep on the affected shoulder and denies significant pain at night.    He reports recent sleep disturbances, mentioning a night where he could not sleep at all despite taking his usual medication (Razapam, 4 mg). This led to a discussion about undergoing a sleep study.    He denies constant pain and pain with all activities. He denies any specific medical diagnoses.    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  - He has difficulty using nail gun for overhead work  - He painted overhead ceiling in his house, which was challenging  - He can perform tasks at lower heights without issues  - He expresses desire to return to full work capacity      ROS:  Cardiovascular: +arm pain on exertion  Musculoskeletal: +pain with movement  Neurological: +sleep difficulty, +difficulty falling asleep          Past Medical History:   Diagnosis Date    Diabetes mellitus     Hypertension     Mixed hyperlipidemia      Past Surgical History:   Procedure Laterality Date    BACK SURGERY      CHOLECYSTECTOMY      COLONOSCOPY N/A 3/25/2025    Procedure: COLON;  Surgeon: Tommy Markham MD;  Location: [facility] ENDOSCOPY;  Service:  "Gastroenterology;  Laterality: N/A;    COLONOSCOPY, WITH 1 OR MORE BIOPSIES  3/25/2025    Procedure: COLONOSCOPY, WITH 1 OR MORE BIOPSIES;  Surgeon: Tommy Markham MD;  Location: [facility] ENDOSCOPY;  Service: Gastroenterology;;    COLONOSCOPY, WITH POLYPECTOMY USING HOT BIOPSY FORCEPS  3/25/2025    Procedure: COLONOSCOPY, WITH POLYPECTOMY USING HOT BIOPSY FORCEPS;  Surgeon: Tommy Markham MD;  Location: [facility] ENDOSCOPY;  Service: Gastroenterology;;    COLONOSCOPY, WITH POLYPECTOMY USING SNARE  3/25/2025    Procedure: COLONOSCOPY, WITH POLYPECTOMY USING SNARE;  Surgeon: Tommy Markham MD;  Location: [facility] ENDOSCOPY;  Service: Gastroenterology;;    hemorrhoid ectomy       Social History[1]    Current Medications[2]  Review of patient's allergies indicates:  No Known Allergies    /77 (BP Location: Left arm, Patient Position: Sitting)   Pulse 73   Ht 6' 4" (1.93 m)   Wt 102.1 kg (225 lb)   BMI 27.39 kg/m²     Comprehensive review of systems completed and negative except as per HPI.        Objective:   General: Well-developed, well-nourished.  Neuro: Alert and oriented x 3.  Psych: Normal mood and affect.  Card: Regular rate and rhythm  Resp: Respirations regular and unlabored    Shoulder Exam:  No obvious deformity. No medial or lateral scapula winging. Forward flexion to 140 degrees and abduction to 140 degrees. Positive empty can,  positive Whipple, Positive drop arm test, Correa, impingement, Positive AC joint tenderness. Negative biceps groove tenderness, Positive Flanagan´s, Yergason´s, Speed test. Negative Apprehension and Relocation test. 4/5 strength, normal skin appearance and palpable pulses distally. Sensibility normal.        Assessment:         1. Nontraumatic complete tear of right rotator cuff          Plan:             Imaging and exam findings discussed.     Assessment & Plan    PROCEDURES:  - Discussed rotator cuff repair as a treatment option for the patient's rotator cuff tear.  Due to " the fact that he does not have significant fatty infiltration of the rotator cuff tendons.  For this reason I think we can proceed with a rotator cuff repair versus a reverse shoulder arthroplasty.  - Surgery would require 4-6 months of recovery time.  - Provided patient with reading material about the procedure for further consideration.    FOLLOW UP:  - Contact the office when ready to proceed with rotator cuff repair.               All questions were answered. Patient happy and in agreement with the plan.     This note was generated with the assistance of ambient listening technology. Verbal consent was obtained by the patient and accompanying visitor(s) for the recording of patient appointment to facilitate this note. I attest to having reviewed and edited the generated note for accuracy, though some syntax or spelling errors may persist. Please contact the author of this note for any clarification.         [1]   Social History  Socioeconomic History    Marital status: Single   Tobacco Use    Smoking status: Never    Smokeless tobacco: Never   Substance and Sexual Activity    Alcohol use: Not Currently    Drug use: Never   [2]   Current Outpatient Medications:     amLODIPine (NORVASC) 10 MG tablet, Take 10 mg by mouth., Disp: , Rfl:     ascorbic Acid (VITAMIN C) 500 mg CpSR, Take 500 mg by mouth., Disp: , Rfl:     calcium carbonate (TUMS) 300 mg (750 mg) Chew, Take 300 mg by mouth., Disp: , Rfl:     ferrous sulfate (FEOSOL) 325 mg (65 mg iron) Tab tablet, Take 65 mg by mouth., Disp: , Rfl:     glipiZIDE (GLUCOTROL) 5 MG tablet, , Disp: , Rfl:     hydrOXYzine pamoate (VISTARIL) 25 MG Cap, Take 1 capsule (25 mg total) by mouth nightly as needed (insomnia)., Disp: 10 capsule, Rfl: 0    LORazepam (ATIVAN) 2 MG Tab, Take 2 mg by mouth 2 (two) times daily as needed., Disp: , Rfl:     metoprolol succinate (TOPROL-XL) 25 MG 24 hr tablet, Take 25 mg by mouth., Disp: , Rfl:     rosuvastatin (CRESTOR) 40 MG Tab, Take 40 mg  by mouth., Disp: , Rfl:     vitamin A 8000 UNIT capsule, Take 8,000 Units by mouth., Disp: , Rfl:     XARELTO 20 mg Tab, Take 20 mg by mouth., Disp: , Rfl:     albuterol (PROVENTIL) 2.5 mg /3 mL (0.083 %) nebulizer solution, Take 3 mLs (2.5 mg total) by nebulization every 6 (six) hours as needed for Wheezing. Rescue, Disp: 30 each, Rfl: 3    dexbrompheniramine maleate (ALA-HIST IR) 2 mg Tab, Ala-Hist IR 2 mg tablet  Take 1 tablet 3 times a day by oral route as needed. (Patient not taking: Reported on 4/8/2025), Disp: , Rfl:     XARELTO 10 mg Tab, , Disp: , Rfl:

## 2025-05-25 ENCOUNTER — HOSPITAL ENCOUNTER (EMERGENCY)
Facility: HOSPITAL | Age: 70
Discharge: HOME | End: 2025-05-25
Attending: EMERGENCY MEDICINE
Payer: MEDICARE

## 2025-05-25 VITALS
OXYGEN SATURATION: 100 % | BODY MASS INDEX: 27.3 KG/M2 | SYSTOLIC BLOOD PRESSURE: 160 MMHG | WEIGHT: 224.19 LBS | TEMPERATURE: 97 F | HEART RATE: 70 BPM | RESPIRATION RATE: 18 BRPM | HEIGHT: 76 IN | DIASTOLIC BLOOD PRESSURE: 63 MMHG

## 2025-05-25 DIAGNOSIS — H10.022 OTHER MUCOPURULENT CONJUNCTIVITIS OF LEFT EYE: Primary | ICD-10-CM

## 2025-05-25 PROCEDURE — 99283 EMERGENCY DEPT VISIT LOW MDM: CPT

## 2025-05-25 RX ORDER — POLYMYXIN B SULFATE AND TRIMETHOPRIM 1; 10000 MG/ML; [USP'U]/ML
1 SOLUTION OPHTHALMIC EVERY 4 HOURS
Qty: 10 ML | Refills: 0 | Status: SHIPPED | OUTPATIENT
Start: 2025-05-25 | End: 2025-06-01

## 2025-05-25 NOTE — ED PROVIDER NOTES
Encounter Date: 5/25/2025       History     Chief Complaint   Patient presents with    Eye Pain     Redness and pain to left eye after weed eating yesterday.      69 yrs old male presents with redness and discharge out of left eye after weed eating yesterday.     The history is provided by the patient.     Review of patient's allergies indicates:  No Known Allergies  Past Medical History:   Diagnosis Date    Diabetes mellitus     Hypertension     Mixed hyperlipidemia      Past Surgical History:   Procedure Laterality Date    BACK SURGERY      CHOLECYSTECTOMY      COLONOSCOPY N/A 3/25/2025    Procedure: COLON;  Surgeon: Tommy Markham MD;  Location: [facility] ENDOSCOPY;  Service: Gastroenterology;  Laterality: N/A;    COLONOSCOPY, WITH 1 OR MORE BIOPSIES  3/25/2025    Procedure: COLONOSCOPY, WITH 1 OR MORE BIOPSIES;  Surgeon: Tommy Markham MD;  Location: [facility] ENDOSCOPY;  Service: Gastroenterology;;    COLONOSCOPY, WITH POLYPECTOMY USING HOT BIOPSY FORCEPS  3/25/2025    Procedure: COLONOSCOPY, WITH POLYPECTOMY USING HOT BIOPSY FORCEPS;  Surgeon: Tommy Markham MD;  Location: [facility] ENDOSCOPY;  Service: Gastroenterology;;    COLONOSCOPY, WITH POLYPECTOMY USING SNARE  3/25/2025    Procedure: COLONOSCOPY, WITH POLYPECTOMY USING SNARE;  Surgeon: Tommy Markham MD;  Location: [facility] ENDOSCOPY;  Service: Gastroenterology;;    hemorrhoid ectomy       Family History   Family history unknown: Yes     Social History[1]  Review of Systems   Constitutional:  Negative for fever.   Eyes:  Positive for pain, discharge, redness and itching. Negative for photophobia and visual disturbance.   All other systems reviewed and are negative.      Physical Exam     Initial Vitals [05/25/25 1420]   BP Pulse Resp Temp SpO2   (!) 160/63 70 18 97.1 °F (36.2 °C) 100 %      MAP       --         Physical Exam    Nursing note and vitals reviewed.  Constitutional: He appears well-developed and well-nourished.   HENT:   Head: Normocephalic and  atraumatic.   Right Ear: External ear normal.   Left Ear: External ear normal.   Nose: Nose normal. Mouth/Throat: Oropharynx is clear and moist.   Eyes: EOM and lids are normal. Pupils are equal, round, and reactive to light. Left eye exhibits discharge and exudate. No foreign body present in the left eye. Left conjunctiva is injected. Left conjunctiva has no hemorrhage. Left eye exhibits normal extraocular motion and no nystagmus.   Visual acuity left 20/50, right 20/50, bilateral 20/50   Neck: Neck supple.   Normal range of motion.  Cardiovascular:  Normal rate, regular rhythm, normal heart sounds and intact distal pulses.           Pulmonary/Chest: Breath sounds normal.   Abdominal: Abdomen is soft. Bowel sounds are normal.   Musculoskeletal:         General: Normal range of motion.      Cervical back: Normal range of motion and neck supple.     Neurological: He is alert and oriented to person, place, and time. He has normal strength and normal reflexes. GCS score is 15. GCS eye subscore is 4. GCS verbal subscore is 5. GCS motor subscore is 6.   Skin: Skin is warm and dry. Capillary refill takes less than 2 seconds.   Psychiatric: He has a normal mood and affect. His behavior is normal. Judgment and thought content normal.         ED Course   Procedures  Labs Reviewed - No data to display       Imaging Results    None          Medications - No data to display  Medical Decision Making  Risk  Prescription drug management.                          Medical Decision Making:   Differential Diagnosis:   Stye, conjunctivitis, Corneal abrasion             Clinical Impression:  Final diagnoses:  [H10.022] Other mucopurulent conjunctivitis of left eye (Primary)          ED Disposition Condition    Discharge Stable          ED Prescriptions       Medication Sig Dispense Start Date End Date Auth. Provider    polymyxin B sulf-trimethoprim (POLYTRIM) 10,000 unit- 1 mg/mL Drop Place 1 drop into the left eye every 4 (four) hours.  for 7 days 10 mL 5/25/2025 6/1/2025 Yefri Prabhakar FNP          Follow-up Information       Follow up With Specialties Details Why Contact Info    Theresa Mcclain, NP Family Medicine Schedule an appointment as soon as possible for a visit   [address]   [facility] [address]  [phone]      [facility] - Emergency Dept Emergency Medicine  If symptoms worsen [address] [facility] [address] [phone]    Ophthalmologist of your choice  Schedule an appointment as soon as possible for a visit                      [1]   Social History  Tobacco Use    Smoking status: Never    Smokeless tobacco: Never   Substance Use Topics    Alcohol use: Not Currently    Drug use: Never        Yefri Prabhakar FNP  05/25/25 3449

## 2025-06-03 ENCOUNTER — HOSPITAL ENCOUNTER (OUTPATIENT)
Facility: HOSPITAL | Age: 70
Discharge: HOME | End: 2025-06-03
Attending: INTERNAL MEDICINE | Admitting: INTERNAL MEDICINE
Payer: MEDICARE

## 2025-06-03 ENCOUNTER — ANESTHESIA EVENT (OUTPATIENT)
Dept: ENDOSCOPY | Facility: HOSPITAL | Age: 70
End: 2025-06-03
Payer: MEDICARE

## 2025-06-03 ENCOUNTER — ANESTHESIA (OUTPATIENT)
Dept: ENDOSCOPY | Facility: HOSPITAL | Age: 70
End: 2025-06-03
Payer: MEDICARE

## 2025-06-03 VITALS
SYSTOLIC BLOOD PRESSURE: 149 MMHG | HEART RATE: 61 BPM | OXYGEN SATURATION: 99 % | DIASTOLIC BLOOD PRESSURE: 82 MMHG | HEIGHT: 74 IN | WEIGHT: 215 LBS | TEMPERATURE: 97 F | RESPIRATION RATE: 15 BRPM | BODY MASS INDEX: 27.59 KG/M2

## 2025-06-03 DIAGNOSIS — Z80.0 FAMILY HISTORY OF COLON CANCER: ICD-10-CM

## 2025-06-03 DIAGNOSIS — Z86.0100 HISTORY OF COLON POLYPS: Primary | ICD-10-CM

## 2025-06-03 LAB
GLUCOSE SERPL-MCNC: 167 MG/DL (ref 70–110)
POCT GLUCOSE: 167 MG/DL (ref 70–110)

## 2025-06-03 PROCEDURE — C1773 RET DEV, INSERTABLE: HCPCS | Performed by: INTERNAL MEDICINE

## 2025-06-03 PROCEDURE — 25000003 PHARM REV CODE 250: Performed by: NURSE ANESTHETIST, CERTIFIED REGISTERED

## 2025-06-03 PROCEDURE — 37000008 HC ANESTHESIA 1ST 15 MINUTES: Performed by: INTERNAL MEDICINE

## 2025-06-03 PROCEDURE — 27201423 OPTIME MED/SURG SUP & DEVICES STERILE SUPPLY: Performed by: INTERNAL MEDICINE

## 2025-06-03 PROCEDURE — 63600175 PHARM REV CODE 636 W HCPCS: Performed by: NURSE ANESTHETIST, CERTIFIED REGISTERED

## 2025-06-03 PROCEDURE — 45385 COLONOSCOPY W/LESION REMOVAL: CPT | Mod: PT | Performed by: INTERNAL MEDICINE

## 2025-06-03 PROCEDURE — 37000009 HC ANESTHESIA EA ADD 15 MINS: Performed by: INTERNAL MEDICINE

## 2025-06-03 RX ORDER — ONDANSETRON HYDROCHLORIDE 2 MG/ML
4 INJECTION, SOLUTION INTRAVENOUS ONCE AS NEEDED
Status: DISCONTINUED | OUTPATIENT
Start: 2025-06-03 | End: 2025-06-03 | Stop reason: HOSPADM

## 2025-06-03 RX ORDER — PROPOFOL 10 MG/ML
VIAL (ML) INTRAVENOUS CONTINUOUS PRN
Status: DISCONTINUED | OUTPATIENT
Start: 2025-06-03 | End: 2025-06-03

## 2025-06-03 RX ORDER — SODIUM CHLORIDE, SODIUM GLUCONATE, SODIUM ACETATE, POTASSIUM CHLORIDE AND MAGNESIUM CHLORIDE 30; 37; 368; 526; 502 MG/100ML; MG/100ML; MG/100ML; MG/100ML; MG/100ML
INJECTION, SOLUTION INTRAVENOUS CONTINUOUS
Status: DISCONTINUED | OUTPATIENT
Start: 2025-06-03 | End: 2025-06-03 | Stop reason: HOSPADM

## 2025-06-03 RX ORDER — GLUCAGON 1 MG
1 KIT INJECTION
Status: DISCONTINUED | OUTPATIENT
Start: 2025-06-03 | End: 2025-06-03 | Stop reason: HOSPADM

## 2025-06-03 RX ORDER — LORAZEPAM 2 MG/ML
0.25 INJECTION INTRAMUSCULAR ONCE AS NEEDED
Status: DISCONTINUED | OUTPATIENT
Start: 2025-06-03 | End: 2025-06-03 | Stop reason: HOSPADM

## 2025-06-03 RX ORDER — LIDOCAINE HYDROCHLORIDE 20 MG/ML
INJECTION INTRAVENOUS
Status: DISCONTINUED | OUTPATIENT
Start: 2025-06-03 | End: 2025-06-03

## 2025-06-03 RX ADMIN — PROPOFOL 20 MG: 10 INJECTION, EMULSION INTRAVENOUS at 12:06

## 2025-06-03 RX ADMIN — PROPOFOL 50 MG: 10 INJECTION, EMULSION INTRAVENOUS at 12:06

## 2025-06-03 RX ADMIN — SODIUM CHLORIDE, SODIUM GLUCONATE, SODIUM ACETATE, POTASSIUM CHLORIDE AND MAGNESIUM CHLORIDE: 526; 502; 368; 37; 30 INJECTION, SOLUTION INTRAVENOUS at 12:06

## 2025-06-03 RX ADMIN — LIDOCAINE HYDROCHLORIDE 40 MG: 20 INJECTION INTRAVENOUS at 12:06

## 2025-06-03 RX ADMIN — PROPOFOL 150 MCG/KG/MIN: 10 INJECTION, EMULSION INTRAVENOUS at 12:06

## 2025-06-03 NOTE — DISCHARGE SUMMARY
[facility]  Discharge Note  Short Stay    Procedure(s) (LRB):  COLON (N/A)  COLONOSCOPY, WITH POLYPECTOMY USING SNARE      OUTCOME: Patient tolerated treatment/procedure well without complication and is now ready for discharge.    DISPOSITION: Home or Self Care    FINAL DIAGNOSIS:  History of colon polyps    FOLLOWUP: In clinic    DISCHARGE INSTRUCTIONS:    Discharge Procedure Orders   Diet general        TIME SPENT ON DISCHARGE: 7 minutes

## 2025-06-05 LAB — PSYCHE PATHOLOGY RESULT: NORMAL

## (undated) DEVICE — FORCEP ALLIGATOR 2.8MM W/NDL

## (undated) DEVICE — MANIFOLD 4 PORT

## (undated) DEVICE — TIP SUCTION YANKAUER

## (undated) DEVICE — COLLECTION SPECIMEN NEPTUNE

## (undated) DEVICE — SNARE SNAREMASTER PLUS 15MM

## (undated) DEVICE — TRAPEASE POLYP SINGLE 29-750

## (undated) DEVICE — FORCEP BX LG CAP 2.8MMX240CM

## (undated) DEVICE — DEFOAMER WATER SOLUBLE ENDO

## (undated) DEVICE — DEVICE RESOLUTION 360 CLIP

## (undated) DEVICE — CABLE EKG DL RBBN 3 LEAD DISP

## (undated) DEVICE — KIT SURGICAL COLON .25 1.1OZ

## (undated) DEVICE — CONTAINER SPEC STRL PATH 4OZ

## (undated) DEVICE — BAG LABGUARD BIOHAZARD 6X9IN

## (undated) DEVICE — LINER MEDI-VAC PPV FLTR 1500CC

## (undated) DEVICE — ELECTRODE REM PLYHSV RETURN 9

## (undated) DEVICE — ADAPTER DUAL NSL LUER M-M 7FT

## (undated) DEVICE — TRAP ETRAP POLYP 50 TRAY

## (undated) DEVICE — SOL IRRI STRL WATER 1000ML

## (undated) DEVICE — UNDERPAD PROTECT PLUS 17X24IN

## (undated) DEVICE — Device

## (undated) DEVICE — KIT CANIST SUCTION 1200CC

## (undated) DEVICE — CLIP RESOLUTION 360 ULT 235CM

## (undated) DEVICE — SYRINGE 0.9% NACL 10MIL PREFIL

## (undated) DEVICE — LIFTER ELEVIEW 5X5.1X1.2IN

## (undated) DEVICE — SNARE SNAREMASTER PLUS 10MM